# Patient Record
Sex: MALE | Race: WHITE | Employment: FULL TIME | URBAN - NONMETROPOLITAN AREA
[De-identification: names, ages, dates, MRNs, and addresses within clinical notes are randomized per-mention and may not be internally consistent; named-entity substitution may affect disease eponyms.]

---

## 2017-02-07 ENCOUNTER — COMMUNICATION - GICH (OUTPATIENT)
Dept: FAMILY MEDICINE | Facility: OTHER | Age: 52
End: 2017-02-07

## 2017-02-07 DIAGNOSIS — F32.5 MAJOR DEPRESSIVE DISORDER, SINGLE EPISODE, IN FULL REMISSION (H): ICD-10-CM

## 2017-02-08 ENCOUNTER — OFFICE VISIT - GICH (OUTPATIENT)
Dept: FAMILY MEDICINE | Facility: OTHER | Age: 52
End: 2017-02-08

## 2017-02-08 ENCOUNTER — HISTORY (OUTPATIENT)
Dept: FAMILY MEDICINE | Facility: OTHER | Age: 52
End: 2017-02-08

## 2017-02-08 DIAGNOSIS — Z00.00 ENCOUNTER FOR GENERAL ADULT MEDICAL EXAMINATION WITHOUT ABNORMAL FINDINGS: ICD-10-CM

## 2017-02-08 DIAGNOSIS — F33.0 MAJOR DEPRESSIVE DISORDER, RECURRENT, MILD (H): ICD-10-CM

## 2017-02-08 LAB
CHOL/HDL RATIO - HISTORICAL: 5.79
CHOLESTEROL TOTAL: 197 MG/DL
GLUCOSE SERPL-MCNC: 105 MG/DL (ref 70–105)
HDLC SERPL-MCNC: 34 MG/DL (ref 23–92)
LDLC SERPL CALC-MCNC: 143 MG/DL
NON-HDL CHOLESTEROL - HISTORICAL: 163 MG/DL
PATIENT STATUS - HISTORICAL: ABNORMAL
TRIGL SERPL-MCNC: 100 MG/DL

## 2017-02-08 ASSESSMENT — PATIENT HEALTH QUESTIONNAIRE - PHQ9: SUM OF ALL RESPONSES TO PHQ QUESTIONS 1-9: 6

## 2017-02-14 ENCOUNTER — HOSPITAL ENCOUNTER (OUTPATIENT)
Dept: RADIOLOGY | Facility: OTHER | Age: 52
End: 2017-02-14
Attending: FAMILY MEDICINE

## 2017-02-14 DIAGNOSIS — Z00.00 ENCOUNTER FOR GENERAL ADULT MEDICAL EXAMINATION WITHOUT ABNORMAL FINDINGS: ICD-10-CM

## 2018-01-03 NOTE — PROGRESS NOTES
Patient Information     Patient Name MRN Sex Frank Hercules [id number] Male 1965      Progress Notes by Shemar Mckinney MD at 2017  8:34 AM     Author:  Shemar Mckinney MD Service:  (none) Author Type:  Physician     Filed:  2017  1:26 PM Encounter Date:  2017 Status:  Signed     :  Shemar Mckinney MD (Physician)              SUBJECTIVE:    Frank Arthur is a 51 y.o. male who presents for px    HPI: His brother had a triple bypass last year, at age 48.  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  Ended up at [facility] and they told him he has congenital disease.  Patient has no symptoms, but wants to look into it.  Patient has no symptoms at all.  No chest pain or shortness of breath.  He can nordic track and cross country ski for 1 hour without limitations.    Last year we increased the effexor to 225.  He has to pay 2 copays.  For the last month he has been on just 150 and wants to get back to 225 by taking 3 75 mg tabs daily.  PROBLEM LIST:  Patient Active Problem List     Diagnosis  Code     Wrist pain M25.539     Prediabetes R73.03     Major depressive disorder, recurrent, mild (HC) F33.0     Family history of colon cancer requiring screening colonoscopy Z80.0     PAST MEDICAL HISTORY:  Past Medical History     Diagnosis  Date     Family history of colon cancer requiring screening colonoscopy 2016     Major depressive disorder, recurrent episode, mild (HC)      Prediabetes      Wrist pain      SURGICAL HISTORY:  Past Surgical History       Procedure   Laterality Date     Colonoscopy screening        father w colon ca        Vasectomy        Shoulder decompresssion  Right      Pr colonoscopy remove esther polyp [facility]   2016               SOCIAL HISTORY:  Social History     Social History        Marital status:       Spouse name: N/A     Number of children:  N/A     Years of education:  N/A     Occupational History      Not on file.     Social History Main  Topics       Smoking status: Never Smoker     Smokeless tobacco: Never Used     Alcohol use Yes     Drug use: No     Sexual activity: Not on file     Other Topics  Concern     Not on file      Social History Narrative      at [employer].  College at [employer]     FAMILY HISTORY:No family history on file.   CURRENT MEDICATIONS:   Current Outpatient Prescriptions       Medication  Sig Dispense Refill     DME Left thumb spica splint 1 Each 0     polyethylene glycol-electrolyte (NULYTELY) 420 gram solution Take 240 mL by mouth every 10 minutes. 4000 mL 0     venlafaxine (EFFEXOR XR) 150 mg Extended-Release capsule TAKE 1 CAPSULE DAILY WITH EVENING MEAL 90 capsule 3     venlafaxine (EFFEXOR XR) 75 mg cp24 Extended-Release capsule Take 1 capsule by mouth once daily with a meal. Along with 150 mg for total 225 mg daily 90 capsule 3     No current facility-administered medications for this visit.      Medications have been reviewed by me and are current to the best of my knowledge and ability.    ALLERGIES:  Review of patient's allergies indicates no known allergies.    REVIEW OF SYSTEMS:  General: denies any general problems.  Eyes: denies problems  Ears/Nose/Throat: denies problems  Cardiovascular: denies problems  Respiratory: denies problems  Gastrointestinal: denies problems  Genitourinary: denies problems  Musculoskeletal: denies problems  Skin: denies problems  Neurologic: denies problems  Psychiatric: denies problems  Endocrine: denies problems  Heme/Lymphatic: denies problems  Allergic/Immunologic: denies problems  PHQ Depression Screening 2/8/2017   Date of PHQ exam (doc flow) 2/8/2017   1. Lack of interest/pleasure 1 - Several days   2. Feeling down/depressed 1 - Several days   PHQ-2 TOTAL SCORE 2   3. Trouble sleeping 0 - Not at all   4. Decreased energy 1 - Several days   5. Appetite change 1 - Several days   6. Feelings of failure 2 - More than half the days   7. Trouble concentrating 0 - Not at all   8.  "Activity level 0 - Not at all   9. Hurting yourself 0 - Not at all   PHQ-9 TOTAL SCORE 6   PHQ-9 Severity Level mild   Functional Impairment somewhat difficult      OBJECTIVE:  /66  Pulse 70  Resp 14  Ht 1.791 m (5' 10.5\")  Wt 82.9 kg (182 lb 12.8 oz)  BMI 25.86 kg/m2  EXAM:   General Appearance: Pleasant, alert, appropriate appearance for age. No acute distress  Head Exam: Normal. Normocephalic, atraumatic.  Eye Exam:  Normal external eye, conjunctiva, lids, cornea. ROMY.  Ear Exam: Normal TM's bilaterally. Normal auditory canals and external ears. Non-tender.  Nose Exam: Normal external nose, mucus membranes, and septum.  OroPharynx Exam:  Dental hygiene adequate. Normal buccal mucosa. Normal pharynx.  Neck Exam:  Supple, no masses or nodes.  Thyroid Exam: No nodules or enlargement.  Chest/Respiratory Exam: Normal chest wall and respirations. Clear to auscultation.  Cardiovascular Exam: Regular rate and rhythm. S1, S2, no murmur, click, gallop, or rubs.  Gastrointestinal Exam: Soft, non-tender, no masses or organomegaly.  Rectal Exam: Normal sphincter tone. No masses noted.  Lymphatic Exam: Non-palpable nodes in neck, clavicular, axillary, or inguinal regions.  Skin: no rash or abnormalities  Neurologic Exam: Nonfocal, symmetric DTRs, normal gross motor, tone coordination and no tremor.  Psychiatric Exam: Alert and oriented - appropriate affect.    PHQ Depression Screening 1/18/2016 2/8/2017   Date of PHQ exam (doc flow) 1/18/2016 2/8/2017   1. Lack of interest/pleasure 1 - Several days 1 - Several days   2. Feeling down/depressed 1 - Several days 1 - Several days   PHQ-2 TOTAL SCORE 2 2   3. Trouble sleeping 1 - Several days 0 - Not at all   4. Decreased energy 1 - Several days 1 - Several days   5. Appetite change 2 - More than half the days 1 - Several days   6. Feelings of failure 2 - More than half the days 2 - More than half the days   7. Trouble concentrating 1 - Several days 0 - Not at all   8. " Activity level 0 - Not at all 0 - Not at all   9. Hurting yourself 0 - Not at all 0 - Not at all   PHQ-9 TOTAL SCORE 9 6   PHQ-9 Severity Level mild mild   Functional Impairment not difficult at all somewhat difficult       ASSESSMENT/PLAN:    ICD-10-CM    1. Routine medical exam Z00.00 CT CARDIAC CALCIUM SCORE ONLY WO SINGLE READ      LIPID PANEL      GLUCOSE, FASTING   2. Major depressive disorder, recurrent, mild (HC) F33.0 venlafaxine (EFFEXOR XR) 75 mg cp24 Extended-Release capsule     Mr. Poole Body mass index is 25.86 kg/(m^2). This is out of the normal range for a 51 y.o. Normal range for ages 18-64 is between 18.5 and 24.9; normal range for ages 65+ is 23-30. To lose weight we reviewed risks and benefits of appropriate options such as diet, exercise, and medications. Patient's strategy will be  self-directed exercise program  BP Readings from Last 1 Encounters:02/08/17 : 126/66  Mr. Poole blood pressure is out of the normal range for adults. Per JNC-8 guidelines normal adult blood pressure is < 120/80, pre-hypertensive is between 120/80 and 139/89, and hypertension is 140/90 or greater. Risks of hypertension were discussed. Patient's strategy will be to recheck blood pressure in 12 months.    Regarding his coronary artery disease risk, is at 5% on the calculator.  Discussed with him either a stress test or a calcium score.  He wants to do the CT, and I feel this is appropriate.    Shemar Mckinney MD ....................  2/8/2017   8:55 AM

## 2018-01-03 NOTE — TELEPHONE ENCOUNTER
Patient Information     Patient Name MRN Frank Clements N [id number] Male 1965      Telephone Encounter by Catherine Heller RN at 2017  8:54 AM     Author:  Catherine Heller RN Service:  (none) Author Type:  (none)     Filed:  2017  9:01 AM Encounter Date:  2017 Status:  Signed     :  Catherine Heller RN (NURS- Registered Nurse)            Patient will be seen tomorrow and have prescription addressed at that time. Refill forwarded to PCP for consideration at [facility] - 2017 8:15 am.    Depression-in adults 18 and over  Serotonin/Norepinephrine Reuptake Inhibitors    Office visit in the past 12 months or as indicated in chart.  Should have clinic visit 1-2 months after initial prescription.    Last visit with THANG HEATH was on: 2016 in [facility]  Next visit with THANG HEATH is on: 2017 in [facility]  Next visit with Family Practice is on: 2017 in [facility]    Max refills 12 months from last office visit or per providers notes.    PHQ Depression Screening 2016   Date of PHQ exam (doc flow) 2016   1. Lack of interest/pleasure 1 - Several days   2. Feeling down/depressed 1 - Several days   PHQ-2 TOTAL SCORE 2   3. Trouble sleeping 1 - Several days   4. Decreased energy 1 - Several days   5. Appetite change 2 - More than half the days   6. Feelings of failure 2 - More than half the days   7. Trouble concentrating 1 - Several days   8. Activity level 0 - Not at all   9. Hurting yourself 0 - Not at all   PHQ-9 TOTAL SCORE 9   PHQ-9 Severity Level mild   Functional Impairment not difficult at all     Patient will be seen tomorrow and have prescription addressed at that time. Refill forwarded to PCP for consideration at [facility] - 2017 8:15 am.    Unable to complete prescription refill per RN Medication Refill Policy.................... Catherine Heller  ....................  2/7/2017   8:59 AM

## 2018-01-16 ENCOUNTER — COMMUNICATION - GICH (OUTPATIENT)
Dept: FAMILY MEDICINE | Facility: OTHER | Age: 53
End: 2018-01-16

## 2018-01-16 DIAGNOSIS — F33.0 MAJOR DEPRESSIVE DISORDER, RECURRENT, MILD (H): ICD-10-CM

## 2018-01-25 VITALS
DIASTOLIC BLOOD PRESSURE: 66 MMHG | HEART RATE: 70 BPM | SYSTOLIC BLOOD PRESSURE: 126 MMHG | HEIGHT: 71 IN | WEIGHT: 182.8 LBS | RESPIRATION RATE: 14 BRPM | BODY MASS INDEX: 25.59 KG/M2

## 2018-01-29 ASSESSMENT — PATIENT HEALTH QUESTIONNAIRE - PHQ9: SUM OF ALL RESPONSES TO PHQ QUESTIONS 1-9: 6

## 2018-02-13 NOTE — TELEPHONE ENCOUNTER
Patient Information     Patient Name MRN Sex Frank Hercules N [id number] Male 1965      Telephone Encounter by Mayela Hardin RN at 2018 11:27 AM     Author:  Mayela Hardin RN Service:  (none) Author Type:  NURS- Registered Nurse     Filed:  2018 11:28 AM Encounter Date:  2018 Status:  Signed     :  Mayela Hardin RN (NURS- Registered Nurse)            Medication filled 2017 #[id number] R3  Unable to complete prescription refill per RN Medication Refill Policy.................... Mayela Hardin RN ....................  2018   11:28 AM

## 2018-02-21 ENCOUNTER — DOCUMENTATION ONLY (OUTPATIENT)
Dept: FAMILY MEDICINE | Facility: OTHER | Age: 53
End: 2018-02-21

## 2018-02-21 RX ORDER — VENLAFAXINE HYDROCHLORIDE 75 MG/1
225 CAPSULE, EXTENDED RELEASE ORAL
COMMUNITY
Start: 2017-02-08 | End: 2018-03-29

## 2018-02-21 RX ORDER — POLYETHYLENE GLYCOL 3350, SODIUM CHLORIDE, SODIUM BICARBONATE, POTASSIUM CHLORIDE 420; 11.2; 5.72; 1.48 G/4L; G/4L; G/4L; G/4L
240 POWDER, FOR SOLUTION ORAL
COMMUNITY
Start: 2016-04-06 | End: 2019-06-07

## 2018-03-28 DIAGNOSIS — F41.1 GAD (GENERALIZED ANXIETY DISORDER): Primary | ICD-10-CM

## 2018-03-29 RX ORDER — VENLAFAXINE HYDROCHLORIDE 75 MG/1
225 CAPSULE, EXTENDED RELEASE ORAL
Qty: 90 CAPSULE | Refills: 9 | Status: SHIPPED | OUTPATIENT
Start: 2018-03-29 | End: 2018-05-07

## 2018-05-07 ENCOUNTER — TELEPHONE (OUTPATIENT)
Dept: FAMILY MEDICINE | Facility: OTHER | Age: 53
End: 2018-05-07

## 2018-05-07 ENCOUNTER — OFFICE VISIT (OUTPATIENT)
Dept: FAMILY MEDICINE | Facility: OTHER | Age: 53
End: 2018-05-07
Attending: FAMILY MEDICINE
Payer: COMMERCIAL

## 2018-05-07 VITALS
RESPIRATION RATE: 16 BRPM | SYSTOLIC BLOOD PRESSURE: 126 MMHG | HEART RATE: 58 BPM | DIASTOLIC BLOOD PRESSURE: 70 MMHG | HEIGHT: 71 IN | WEIGHT: 181.8 LBS | BODY MASS INDEX: 25.45 KG/M2

## 2018-05-07 DIAGNOSIS — R06.09 DYSPNEA ON EXERTION: ICD-10-CM

## 2018-05-07 DIAGNOSIS — F41.1 GAD (GENERALIZED ANXIETY DISORDER): ICD-10-CM

## 2018-05-07 DIAGNOSIS — Z82.49 FAMILY HISTORY OF CORONARY ARTERY DISEASE IN BROTHER: ICD-10-CM

## 2018-05-07 DIAGNOSIS — Z00.00 ROUTINE GENERAL MEDICAL EXAMINATION AT A HEALTH CARE FACILITY: Primary | ICD-10-CM

## 2018-05-07 LAB
GLUCOSE SERPL-MCNC: 111 MG/DL (ref 70–105)
LDLC SERPL DIRECT ASSAY-MCNC: 82 MG/DL

## 2018-05-07 PROCEDURE — 83721 ASSAY OF BLOOD LIPOPROTEIN: CPT | Performed by: FAMILY MEDICINE

## 2018-05-07 PROCEDURE — 82947 ASSAY GLUCOSE BLOOD QUANT: CPT | Performed by: FAMILY MEDICINE

## 2018-05-07 PROCEDURE — 99396 PREV VISIT EST AGE 40-64: CPT | Performed by: FAMILY MEDICINE

## 2018-05-07 PROCEDURE — 36415 COLL VENOUS BLD VENIPUNCTURE: CPT | Performed by: FAMILY MEDICINE

## 2018-05-07 RX ORDER — VENLAFAXINE HYDROCHLORIDE 150 MG/1
150 TABLET, EXTENDED RELEASE ORAL
Qty: 90 EACH | Refills: 3 | Status: SHIPPED | OUTPATIENT
Start: 2018-05-07 | End: 2018-05-07

## 2018-05-07 RX ORDER — VENLAFAXINE HYDROCHLORIDE 150 MG/1
150 TABLET, EXTENDED RELEASE ORAL
Qty: 90 EACH | Refills: 3 | Status: SHIPPED | OUTPATIENT
Start: 2018-05-07 | End: 2019-06-07

## 2018-05-07 RX ORDER — VENLAFAXINE HYDROCHLORIDE 75 MG/1
CAPSULE, EXTENDED RELEASE ORAL
Qty: 90 CAPSULE | Refills: 3 | Status: SHIPPED | OUTPATIENT
Start: 2018-05-07 | End: 2019-05-05

## 2018-05-07 RX ORDER — VENLAFAXINE HYDROCHLORIDE 75 MG/1
CAPSULE, EXTENDED RELEASE ORAL
Qty: 90 CAPSULE | Refills: 3 | Status: SHIPPED | OUTPATIENT
Start: 2018-05-07 | End: 2018-05-07

## 2018-05-07 ASSESSMENT — ANXIETY QUESTIONNAIRES
7. FEELING AFRAID AS IF SOMETHING AWFUL MIGHT HAPPEN: NOT AT ALL
3. WORRYING TOO MUCH ABOUT DIFFERENT THINGS: NOT AT ALL
2. NOT BEING ABLE TO STOP OR CONTROL WORRYING: NOT AT ALL
IF YOU CHECKED OFF ANY PROBLEMS ON THIS QUESTIONNAIRE, HOW DIFFICULT HAVE THESE PROBLEMS MADE IT FOR YOU TO DO YOUR WORK, TAKE CARE OF THINGS AT HOME, OR GET ALONG WITH OTHER PEOPLE: NOT DIFFICULT AT ALL
1. FEELING NERVOUS, ANXIOUS, OR ON EDGE: NOT AT ALL
5. BEING SO RESTLESS THAT IT IS HARD TO SIT STILL: NOT AT ALL
6. BECOMING EASILY ANNOYED OR IRRITABLE: NOT AT ALL
GAD7 TOTAL SCORE: 0

## 2018-05-07 ASSESSMENT — PATIENT HEALTH QUESTIONNAIRE - PHQ9: 5. POOR APPETITE OR OVEREATING: NOT AT ALL

## 2018-05-07 ASSESSMENT — PAIN SCALES - GENERAL: PAINLEVEL: NO PAIN (0)

## 2018-05-07 NOTE — PROGRESS NOTES
"    SUBJECTIVE:   CC: Frank Arthur is an 52 year old male who presents for preventative health visit.     Healthy Habits:    Do you get at least three servings of calcium containing foods daily (dairy, green leafy vegetables, etc.)? yes    Amount of exercise or daily activities, outside of work: 1 hour(s) per day    Problems taking medications regularly No    Medication side effects: No    Have you had an eye exam in the past two years? yes    Do you see a dentist twice per year? yes    Do you have sleep apnea, excessive snoring or daytime drowsiness?no       Brother with significant cad, sp CABG.  Younger brother.  Lately pt notes he has had heavier breathing with yard work.  Walks his dog over 3 miles daily, and sometimes has some shortness of breath with this, but never any chest pain at all.  Feels \"anxiety in my torso\", when worried about his brother.    Today's PHQ-2 Score:   PHQ-2 ( 1999 Pf[employer]) 5/7/2018   Q1: Little interest or pleasure in doing things 0   Q2: Feeling down, depressed or hopeless 0   PHQ-2 Score 0           Social History   Substance Use Topics     Smoking status: Never Smoker     Smokeless tobacco: Never Used     Alcohol use Yes      If you drink alcohol do you typically have >3 drinks per day or >7 drinks per week? No                      Last PSA: No results found for: PSA    Reviewed orders with patient. Reviewed health maintenance and updated orders accordingly - Yes  Labs reviewed in [facility]  Current Outpatient Prescriptions   Medication Sig Dispense Refill     venlafaxine (EFFEXOR-ER) 150 MG TB24 24 hr tablet Take 1 tablet (150 mg) by mouth daily (with breakfast) 90 each 3     venlafaxine (EFFEXOR-XR) 75 MG 24 hr capsule 1 daily along with 1 150 milligram for 225 milligram daily total 90 capsule 3     OMEPRAZOLE PO Take 20 mg by mouth 2 times daily (before meals)       order for DME Left thumb spica splint       polyethylene glycol-electrolytes (NULYTELY) 420 G solution Take 240 mLs " "by mouth every 10 minutes       [DISCONTINUED] venlafaxine (EFFEXOR-ER) 150 MG TB24 Take 150 mg by mouth daily (with breakfast)       [DISCONTINUED] venlafaxine (EFFEXOR-XR) 75 MG 24 hr capsule Take 3 capsules (225 mg) by mouth daily with food 90 capsule 9     No Known Allergies    Reviewed and updated as needed this visit by clinical staff  Tobacco  Allergies  Meds  Med Hx  Surg Hx  Fam Hx  Soc Hx        Reviewed and updated as needed this visit by Provider        Past Medical History:   Diagnosis Date     Family history of malignant neoplasm of digestive organ     4/7/2016     Major depressive disorder, recurrent, mild (H)     No Comments Provided     Pain in wrist     No Comments Provided     Prediabetes     No Comments Provided      Past Surgical History:   Procedure Laterality Date     COLONOSCOPY      2005,father w colon ca     COLONOSCOPY      4/7/2016     OTHER SURGICAL HISTORY      [id number],OTHER,Right     VASECTOMY      No Comments Provided       ROS:  C: NEGATIVE for fever, chills, change in weight  I: NEGATIVE for worrisome rashes, moles or lesions  E: NEGATIVE for vision changes or irritation  ENT: NEGATIVE for ear, mouth and throat problems  RESP:as above  CV: NEGATIVE for chest pain, palpitations or peripheral edema  GI: NEGATIVE for nausea, abdominal pain, heartburn, or change in bowel habits   male: negative for dysuria, hematuria, decreased urinary stream, erectile dysfunction, urethral discharge  M: NEGATIVE for significant arthralgias or myalgia  N: NEGATIVE for weakness, dizziness or paresthesias  P: NEGATIVE for changes in mood or affect    OBJECTIVE:   /70 (BP Location: Right arm, Patient Position: Sitting, Cuff Size: Adult Large)  Pulse 58  Resp 16  Ht 5' 10.5\" (1.791 m)  Wt 181 lb 12.8 oz (82.5 kg)  BMI 25.72 kg/m2  EXAM:  GENERAL: healthy, alert and no distress  EYES: Eyes grossly normal to inspection, PERRL and conjunctivae and sclerae normal  HENT: ear canals and TM's " "normal, nose and mouth without ulcers or lesions  NECK: no adenopathy, no asymmetry, masses, or scars and thyroid normal to palpation  RESP: lungs clear to auscultation - no rales, rhonchi or wheezes  CV: regular rate and rhythm, normal S1 S2, no S3 or S4, no murmur, click or rub, no peripheral edema and peripheral pulses strong  ABDOMEN: soft, nontender, no hepatosplenomegaly, no masses and bowel sounds normal  MS: no gross musculoskeletal defects noted, no edema  SKIN: no suspicious lesions or rashes  NEURO: Normal strength and tone, mentation intact and speech normal  PSYCH: mentation appears normal, affect normal/bright    ASSESSMENT/PLAN:   (Z00.00) Routine general medical examination at a health care facility  (primary encounter diagnosis)  Comment: stable  Plan: LDL cholesterol direct, Glucose             (F41.1) RAFAELA (generalized anxiety disorder)  Comment: stable  Plan: venlafaxine (EFFEXOR-ER) 150 MG TB24 24 hr         tablet, venlafaxine (EFFEXOR-XR) 75 MG 24 hr         capsule        refilled    (R06.09) Dyspnea on exertion  Comment: new  Plan: Exercise Stress test        Has a possible cad diagnosis.  Since he wants to remain active, will get a stress test.    (Z82.49) Family history of coronary artery disease in brother  Comment:    Plan: Exercise Stress test               COUNSELING:  Reviewed preventive health counseling, as reflected in patient instructions       Regular exercise       Healthy diet/nutrition    BP Screening:   Last 3 BP Readings:    BP Readings from Last 3 Encounters:   05/07/18 126/70   02/08/17 126/66   01/18/16 128/68       The following was recommended to the patient:  Re-screen BP within a year and recommended lifestyle modifications   reports that he has never smoked. He has never used smokeless tobacco.    Estimated body mass index is 25.72 kg/(m^2) as calculated from the following:    Height as of this encounter: 5' 10.5\" (1.791 m).    Weight as of this encounter: 181 lb 12.8 " oz (82.5 kg).   Weight management plan: Discussed healthy diet and exercise guidelines and patient will follow up in 12 months in clinic to re-evaluate.    Counseling Resources:  ATP IV Guidelines  Pooled Cohorts Equation Calculator  FRAX Risk Assessment  ICSI Preventive Guidelines  Dietary Guidelines for Americans, 2010  USDA's MyPlate  ASA Prophylaxis  Lung CA Screening    Shemar Mckinney MD  [facility] AND [facility]

## 2018-05-07 NOTE — LETTER
"May 8, 2018      Frank Arthur  [address] [place] [address]        Dear Frank,     The labs still show the \"pre\" diabetes.  Keep working on the diet changes.  Cholesterol is great.    Results for orders placed or performed in visit on 05/07/18   LDL cholesterol direct   Result Value Ref Range    LDL Cholesterol Direct 82 <100 mg/dL   Glucose   Result Value Ref Range    Glucose 111 (H) 70 - 105 mg/dL           Sincerely,        Shemar Mckinney MD          "

## 2018-05-07 NOTE — TELEPHONE ENCOUNTER
Would need a 90 day of the effexor to add up to 225 mg  Rose Hollingsworth LPN on 5/7/2018 at 11:08 AM

## 2018-05-07 NOTE — MR AVS SNAPSHOT
After Visit Summary   5/7/2018    Frank Arthur    MRN: [id number]           Patient Information     Date Of Birth          1965        Visit Information        Provider Department      5/7/2018 8:15 AM Shemar Mckinney MD [facility]        Today's Diagnoses     Routine general medical examination at a health care facility    -  1    RAFAELA (generalized anxiety disorder)        Dyspnea on exertion        Family history of coronary artery disease in brother          Care Instructions      Preventive Health Recommendations  Male Ages 50 - 64    Yearly exam:             See your health care provider every year in order to  o   Review health changes.   o   Discuss preventive care.    o   Review your medicines if your doctor has prescribed any.     Have a cholesterol test every 5 years, or more frequently if you are at risk for high cholesterol/heart disease.     Have a diabetes test (fasting glucose) every three years. If you are at risk for diabetes, you should have this test more often.     Have a colonoscopy at age 50, or have a yearly FIT test (stool test). These exams will check for colon cancer.      Talk with your health care provider about whether or not a prostate cancer screening test (PSA) is right for you.    You should be tested each year for STDs (sexually transmitted diseases), if you re at risk.     Shots: Get a flu shot each year. Get a tetanus shot every 10 years.     Nutrition:    Eat at least 5 servings of fruits and vegetables daily.     Eat whole-grain bread, whole-wheat pasta and brown rice instead of white grains and rice.     Talk to your provider about Calcium and Vitamin D.     Lifestyle    Exercise for at least 150 minutes a week (30 minutes a day, 5 days a week). This will help you control your weight and prevent disease.     Limit alcohol to one drink per day.     No smoking.     Wear sunscreen to prevent skin cancer.     See your dentist every six  "months for an exam and cleaning.     See your eye doctor every 1 to 2 years.            Follow-ups after your visit        Future tests that were ordered for you today     Open Future Orders        Priority Expected Expires Ordered    Exercise Stress test Routine  2018            Who to contact     If you have questions or need follow up information about today's clinic visit or your schedule please contact [facility] directly at [phone].  Normal or non-critical lab and imaging results will be communicated to you by Myfacepagehart, letter or phone within 4 business days after the clinic has received the results. If you do not hear from us within 7 days, please contact the clinic through Twitpayt or phone. If you have a critical or abnormal lab result, we will notify you by phone as soon as possible.  Submit refill requests through [employer] or call your pharmacy and they will forward the refill request to us. Please allow 3 business days for your refill to be completed.          Additional Information About Your Visit        [employer] Information     [employer] lets you send messages to your doctor, view your test results, renew your prescriptions, schedule appointments and more. To sign up, go to [email or web address] . Click on \"Log in\" on the left side of the screen, which will take you to the Welcome page. Then click on \"Sign up Now\" on the right side of the page.     You will be asked to enter the access code listed below, as well as some personal information. Please follow the directions to create your username and password.     Your access code is: [email or web address]  Expires: 2018  9:02 AM     Your access code will  in 90 days. If you need help or a new code, please call your [place] clinic or [phone].        Care EveryWhere ID     This is your Care EveryWhere ID. This could be used by other organizations to access your [place] medical records  [id number]        Your " "Vitals Were     Pulse Respirations Height BMI (Body Mass Index)          58 16 5' 10.5\" (1.791 m) 25.72 kg/m2         Blood Pressure from Last 3 Encounters:   05/07/18 126/70   02/08/17 126/66   01/18/16 128/68    Weight from Last 3 Encounters:   05/07/18 181 lb 12.8 oz (82.5 kg)   02/08/17 182 lb 12.8 oz (82.9 kg)   01/18/16 182 lb 3.2 oz (82.6 kg)              We Performed the Following     Glucose     LDL cholesterol direct          Today's Medication Changes          These changes are accurate as of 5/7/18  9:02 AM.  If you have any questions, ask your nurse or doctor.               These medicines have changed or have updated prescriptions.        Dose/Directions    * venlafaxine 150 MG Tb24 24 hr tablet   Commonly known as:  EFFEXOR-ER   This may have changed:  Another medication with the same name was changed. Make sure you understand how and when to take each.   Used for:  RAFAELA (generalized anxiety disorder)   Changed by:  Shemar Mckinney MD        Dose:  150 mg   Take 1 tablet (150 mg) by mouth daily (with breakfast)   Quantity:  90 each   Refills:  3       * venlafaxine 75 MG 24 hr capsule   Commonly known as:  EFFEXOR-XR   This may have changed:    - how much to take  - how to take this  - when to take this  - additional instructions   Used for:  RAFAELA (generalized anxiety disorder)   Changed by:  Shemar Mckinney MD        1 daily along with 1 150 milligram for 225 milligram daily total   Quantity:  90 capsule   Refills:  3       * Notice:  This list has 2 medication(s) that are the same as other medications prescribed for you. Read the directions carefully, and ask your doctor or other care provider to review them with you.         Where to get your medicines      These medications were sent to [facility] [address] [facility] [place] - [address]  [address] [facility] [address]     Phone:  [phone]     venlafaxine 150 MG Tb24 24 hr tablet    venlafaxine 75 MG 24 hr capsule          "       Primary Care Provider Office Phone # Fax #    Shemar Mckinney [phone] [address]  [facility] [address]        Equal Access to Services     LONIRAMIREZ NICKO : Hadii aad ku hadkimkenneth Jim, wajohnnyda luwilmer, qaestherta kabrenda chambers, brooks bynumlila solimanlola milianyessica benavides. So [facility] [phone].    ATENCIÓN: Si habla español, tiene a back disposición servicios gratuitos de asistencia lingüística. Llame al [phone].    We comply with applicable federal civil rights laws and [place] laws. We do not discriminate on the basis of race, color, national origin, age, disability, sex, sexual orientation, or gender identity.            Thank you!     Thank you for choosing [facility]  for your care. Our goal is always to provide you with excellent care. Hearing back from our patients is one way we can continue to improve our services. Please take a few minutes to complete the written survey that you may receive in the mail after your visit with us. Thank you!             Your Updated Medication List - Protect others around you: Learn how to safely use, store and throw away your medicines at www.disposemymeds.org.          This list is accurate as of 5/7/18  9:02 AM.  Always use your most recent med list.                   Brand Name Dispense Instructions for use Diagnosis    OMEPRAZOLE PO      Take 20 mg by mouth 2 times daily (before meals)        order for DME      Left thumb spica splint        polyethylene glycol-electrolytes 420 g solution    NULYTELY     Take 240 mLs by mouth every 10 minutes        * venlafaxine 150 MG Tb24 24 hr tablet    EFFEXOR-ER    90 each    Take 1 tablet (150 mg) by mouth daily (with breakfast)    RAFAELA (generalized anxiety disorder)       * venlafaxine 75 MG 24 hr capsule    EFFEXOR-XR    90 capsule    1 daily along with 1 150 milligram for 225 milligram daily total    RAFAELA (generalized anxiety disorder)       * Notice:  This list  has 2 medication(s) that are the same as other medications prescribed for you. Read the directions carefully, and ask your doctor or other care provider to review them with you.

## 2018-05-07 NOTE — LETTER
May 7, 2018      Frank Arthur  [address] [place] [address]        Dear Frank,       Results for orders placed or performed in visit on 05/07/18   Glucose   Result Value Ref Range    Glucose 111 (H) 70 - 105 mg/dL         Sincerely,        Shemar Mckinney MD

## 2018-05-08 ASSESSMENT — ANXIETY QUESTIONNAIRES: GAD7 TOTAL SCORE: 0

## 2018-05-17 ENCOUNTER — TELEPHONE (OUTPATIENT)
Dept: CARDIAC REHAB | Facility: OTHER | Age: 53
End: 2018-05-17

## 2018-05-17 NOTE — TELEPHONE ENCOUNTER
Called to remind patient of stress test 5/21/18. Message with our direct phone number to call if any questions.

## 2018-05-21 ENCOUNTER — HOSPITAL ENCOUNTER (OUTPATIENT)
Dept: CARDIOLOGY | Facility: OTHER | Age: 53
Discharge: HOME | End: 2018-05-21
Attending: FAMILY MEDICINE | Admitting: FAMILY MEDICINE
Payer: COMMERCIAL

## 2018-05-21 DIAGNOSIS — R06.09 DYSPNEA ON EXERTION: ICD-10-CM

## 2018-05-21 DIAGNOSIS — Z13.220 LIPID SCREENING: ICD-10-CM

## 2018-05-21 DIAGNOSIS — Z82.49 FAMILY HISTORY OF CORONARY ARTERY DISEASE IN BROTHER: ICD-10-CM

## 2018-05-21 LAB
CHOLEST SERPL-MCNC: 156 MG/DL
HDLC SERPL-MCNC: 36 MG/DL (ref 23–92)
LDLC SERPL CALC-MCNC: 80 MG/DL
NONHDLC SERPL-MCNC: 120 MG/DL
TRIGL SERPL-MCNC: 202 MG/DL

## 2018-05-21 PROCEDURE — 93017 CV STRESS TEST TRACING ONLY: CPT

## 2018-05-21 PROCEDURE — 93015 CV STRESS TEST SUPVJ I&R: CPT | Performed by: INTERNAL MEDICINE

## 2018-05-21 PROCEDURE — 80061 LIPID PANEL: CPT | Performed by: FAMILY MEDICINE

## 2018-05-21 PROCEDURE — 36415 COLL VENOUS BLD VENIPUNCTURE: CPT | Performed by: FAMILY MEDICINE

## 2018-05-21 NOTE — PROGRESS NOTES
08:00 Patient arrived for an exercise stress test. The procedure was explained, medications, allergies and history reviewed. The patient was prepped for the stress test.  arrived, and the patient walked 10 minutes and 16 seconds. The patient tolerated the procedure. The patient left in stable condition. Please see the test results, for a full report.

## 2018-05-23 ENCOUNTER — TELEPHONE (OUTPATIENT)
Dept: FAMILY MEDICINE | Facility: OTHER | Age: 53
End: 2018-05-23

## 2019-05-05 DIAGNOSIS — F41.1 GAD (GENERALIZED ANXIETY DISORDER): ICD-10-CM

## 2019-05-05 NOTE — LETTER
May 7, 2019      Frank Arthur  [address] [place] [address]        Dear Frank,     This letter is to remind you that you are due for your annual exam and labs with Shemar Mckinney. Your last comprehensive visit was more than 12 months ago.    A LIMITED refill of Venlafaxine Hcl ER caps 75 mg has been called into your pharmacy. Additional refills require you to complete this appointment.    Please call the clinic at [phone] to schedule your appointment.    If you should require additional refills before your scheduled appointment, please contact your pharmacy and we will refill your medication until the date of your appointment.    If you are no longer seeing Shemar Mckinney for primary care, please call to let us know. Doing so will remove you from our call/contact list.      Thank you for choosing [facility] and [facility] for your health care needs.    Sincerely,    Refill ARIANNA  [facility]

## 2019-05-07 RX ORDER — VENLAFAXINE HYDROCHLORIDE 75 MG/1
CAPSULE, EXTENDED RELEASE ORAL
Qty: 90 CAPSULE | Refills: 0 | Status: SHIPPED | OUTPATIENT
Start: 2019-05-07 | End: 2019-06-07

## 2019-05-07 NOTE — TELEPHONE ENCOUNTER
[facility] sent Rx request for the following:      VENLAFAXINE HCL ER CAPS 75MG  Sig: TAKE 1 CAPSULE DAILY ALONG WITH 150 MG  MG DAILY TOTAL  Last Prescription Date:   5/7/18  Last Fill Qty/Refills:         90, R-3    Last Office Visit:              5/7/18 (Px)  Future Office visit:           None.  Serotonin-Norepinephrine Reuptake Inhibitors Failed5/7 4:31 PM   Blood pressure under 140/90 in past 12 months    Recent (12 mo) or future (30 days) visit within the authorizing provider's specialty    Normal serum creatinine on file in past 12 months     Patient due for annual physical and labs. Reminder letter sent to Pt. Prescription approved per [facility] Refill Protocol for 90-day prachi refill. Ángela Silver RN .............. 5/7/2019  4:35 PM

## 2019-06-07 ENCOUNTER — OFFICE VISIT (OUTPATIENT)
Dept: FAMILY MEDICINE | Facility: OTHER | Age: 54
End: 2019-06-07
Attending: FAMILY MEDICINE
Payer: COMMERCIAL

## 2019-06-07 VITALS
TEMPERATURE: 98.2 F | BODY MASS INDEX: 25.91 KG/M2 | HEART RATE: 76 BPM | SYSTOLIC BLOOD PRESSURE: 124 MMHG | WEIGHT: 181 LBS | HEIGHT: 70 IN | DIASTOLIC BLOOD PRESSURE: 82 MMHG | RESPIRATION RATE: 16 BRPM

## 2019-06-07 DIAGNOSIS — F41.1 GAD (GENERALIZED ANXIETY DISORDER): ICD-10-CM

## 2019-06-07 DIAGNOSIS — R73.09 ELEVATED GLUCOSE: ICD-10-CM

## 2019-06-07 DIAGNOSIS — Z00.00 ROUTINE GENERAL MEDICAL EXAMINATION AT A HEALTH CARE FACILITY: Primary | ICD-10-CM

## 2019-06-07 LAB
CHOLEST SERPL-MCNC: 201 MG/DL
HBA1C MFR BLD: 6.1 % (ref 4–6)
HDLC SERPL-MCNC: 36 MG/DL (ref 23–92)
LDLC SERPL CALC-MCNC: 126 MG/DL
NONHDLC SERPL-MCNC: 165 MG/DL
TRIGL SERPL-MCNC: 193 MG/DL

## 2019-06-07 PROCEDURE — 36415 COLL VENOUS BLD VENIPUNCTURE: CPT | Mod: ZL | Performed by: FAMILY MEDICINE

## 2019-06-07 PROCEDURE — 99396 PREV VISIT EST AGE 40-64: CPT | Performed by: FAMILY MEDICINE

## 2019-06-07 PROCEDURE — 80061 LIPID PANEL: CPT | Mod: ZL | Performed by: FAMILY MEDICINE

## 2019-06-07 PROCEDURE — 83036 HEMOGLOBIN GLYCOSYLATED A1C: CPT | Mod: ZL | Performed by: FAMILY MEDICINE

## 2019-06-07 RX ORDER — VENLAFAXINE HYDROCHLORIDE 75 MG/1
CAPSULE, EXTENDED RELEASE ORAL
Qty: 90 CAPSULE | Refills: 3 | Status: SHIPPED | OUTPATIENT
Start: 2019-06-07 | End: 2020-06-22

## 2019-06-07 RX ORDER — VENLAFAXINE HYDROCHLORIDE 150 MG/1
150 TABLET, EXTENDED RELEASE ORAL
Qty: 90 EACH | Refills: 3 | Status: SHIPPED | OUTPATIENT
Start: 2019-06-07 | End: 2020-06-22

## 2019-06-07 ASSESSMENT — ANXIETY QUESTIONNAIRES
1. FEELING NERVOUS, ANXIOUS, OR ON EDGE: SEVERAL DAYS
2. NOT BEING ABLE TO STOP OR CONTROL WORRYING: SEVERAL DAYS
GAD7 TOTAL SCORE: 7
3. WORRYING TOO MUCH ABOUT DIFFERENT THINGS: SEVERAL DAYS
5. BEING SO RESTLESS THAT IT IS HARD TO SIT STILL: SEVERAL DAYS
6. BECOMING EASILY ANNOYED OR IRRITABLE: SEVERAL DAYS
7. FEELING AFRAID AS IF SOMETHING AWFUL MIGHT HAPPEN: SEVERAL DAYS
IF YOU CHECKED OFF ANY PROBLEMS ON THIS QUESTIONNAIRE, HOW DIFFICULT HAVE THESE PROBLEMS MADE IT FOR YOU TO DO YOUR WORK, TAKE CARE OF THINGS AT HOME, OR GET ALONG WITH OTHER PEOPLE: NOT DIFFICULT AT ALL

## 2019-06-07 ASSESSMENT — PATIENT HEALTH QUESTIONNAIRE - PHQ9
5. POOR APPETITE OR OVEREATING: SEVERAL DAYS
SUM OF ALL RESPONSES TO PHQ QUESTIONS 1-9: 4

## 2019-06-07 ASSESSMENT — MIFFLIN-ST. JEOR: SCORE: 1676.23

## 2019-06-07 NOTE — PROGRESS NOTES
SUBJECTIVE:   CC: Frank Arthur is an 53 year old male who presents for preventive health visit.     Healthy Habits:    Do you get at least three servings of calcium containing foods daily (dairy, green leafy vegetables, etc.)? yes    Amount of exercise or daily activities, outside of work: 0 day(s) per week    Problems taking medications regularly No    Medication side effects: No    Have you had an eye exam in the past two years? yes    Do you see a dentist twice per year? yes    Do you have sleep apnea, excessive snoring or daytime drowsiness?no      Has been on effexor for over 18 years.  Anxiety is great, but he does not want to trial a wean.  Is thinking about one eventually.    Also his fasting glucose was 111 last year, has not changed anything.  Wife recently diagnosed with diabetes and he has been eating better.    Today's PHQ-2 Score:   PHQ-2 ( 1999 Pf[employer]) 5/7/2018   Q1: Little interest or pleasure in doing things 0   Q2: Feeling down, depressed or hopeless 0   PHQ-2 Score 0       Abuse: Current or Past(Physical, Sexual or Emotional)- No  Do you feel safe in your environment? Yes    Social History     Tobacco Use     Smoking status: Never Smoker     Smokeless tobacco: Never Used   Substance Use Topics     Alcohol use: Yes     If you drink alcohol do you typically have >3 drinks per day or >7 drinks per week? No                      Last PSA: No results found for: PSA    Reviewed orders with patient. Reviewed health maintenance and updated orders accordingly - Yes  Labs reviewed in [facility]  Current Outpatient Medications   Medication Sig Dispense Refill     venlafaxine (EFFEXOR-ER) 150 MG 24 hr tablet Take 1 tablet (150 mg) by mouth daily (with breakfast) 90 each 3     venlafaxine (EFFEXOR-XR) 75 MG 24 hr capsule TAKE 1 CAPSULE DAILY ALONG WITH 150 MG  MG DAILY TOTAL 90 capsule 3     No Known Allergies    Reviewed and updated as needed this visit by clinical staff  Tobacco  Allergies  Meds   "Soc Hx        Reviewed and updated as needed this visit by Provider        Past Medical History:   Diagnosis Date     Family history of malignant neoplasm of digestive organ     4/7/2016     Major depressive disorder, recurrent, mild (H)     No Comments Provided     Pain in wrist     No Comments Provided     Prediabetes     No Comments Provided      Past Surgical History:   Procedure Laterality Date     ARTHROSCOPY SHOULDER DECOMPRESSION Right           COLONOSCOPY  2005    father w/ colon cancer     COLONOSCOPY  04/07/2016          VASECTOMY      No Comments Provided       ROS:  CONSTITUTIONAL: NEGATIVE for fever, chills, change in weight  INTEGUMENTARY/SKIN: NEGATIVE for worrisome rashes, moles or lesions  EYES: NEGATIVE for vision changes or irritation  ENT: NEGATIVE for ear, mouth and throat problems  RESP: NEGATIVE for significant cough or SOB  CV: NEGATIVE for chest pain, palpitations or peripheral edema  GI: NEGATIVE for nausea, abdominal pain, heartburn, or change in bowel habits   male: negative for dysuria, hematuria, decreased urinary stream, erectile dysfunction, urethral discharge  MUSCULOSKELETAL: NEGATIVE for significant arthralgias or myalgia  NEURO: NEGATIVE for weakness, dizziness or paresthesias  PSYCHIATRIC: NEGATIVE for changes in mood or affect    OBJECTIVE:   /82   Pulse 76   Temp 98.2  F (36.8  C)   Resp 16   Ht 1.784 m (5' 10.25\")   Wt 82.1 kg (181 lb)   BMI 25.79 kg/m    EXAM:  GENERAL: healthy, alert and no distress  EYES: Eyes grossly normal to inspection, PERRL and conjunctivae and sclerae normal  HENT: ear canals and TM's normal, nose and mouth without ulcers or lesions  NECK: no adenopathy, no asymmetry, masses, or scars and thyroid normal to palpation  RESP: lungs clear to auscultation - no rales, rhonchi or wheezes  CV: regular rate and rhythm, normal S1 S2, no S3 or S4, no murmur, click or rub, no peripheral edema and peripheral pulses strong  ABDOMEN: soft, nontender, " "no hepatosplenomegaly, no masses and bowel sounds normal  MS: no gross musculoskeletal defects noted, no edema  SKIN: no suspicious lesions or rashes  NEURO: Normal strength and tone, mentation intact and speech normal  PSYCH: mentation appears normal, affect normal/bright        ASSESSMENT/PLAN:       ICD-10-CM    1. Routine general medical examination at a health care facility Z00.00 Lipid Profile   2. RAFAELA (generalized anxiety disorder) F41.1 venlafaxine (EFFEXOR-XR) 75 MG 24 hr capsule     venlafaxine (EFFEXOR-ER) 150 MG 24 hr tablet   3. Elevated glucose R73.09 Hemoglobin A1c       COUNSELING:  Reviewed preventive health counseling, as reflected in patient instructions       Regular exercise       Healthy diet/nutrition       Vision screening       Colon cancer screening       Prostate cancer screening    Estimated body mass index is 25.79 kg/m  as calculated from the following:    Height as of this encounter: 1.784 m (5' 10.25\").    Weight as of this encounter: 82.1 kg (181 lb).    Weight management plan: Discussed healthy diet and exercise guidelines     reports that he has never smoked. He has never used smokeless tobacco.      Counseling Resources:  ATP IV Guidelines  Pooled Cohorts Equation Calculator  FRAX Risk Assessment  ICSI Preventive Guidelines  Dietary Guidelines for Americans, 2010  USDA's MyPlate  ASA Prophylaxis  Lung CA Screening    Shemar Mckinney MD  [facility]  "

## 2019-06-07 NOTE — NURSING NOTE
No LMP for male patient.  Medication Reconciliation: complete    Caro Henry LPN  6/7/2019 8:51 AM

## 2019-06-08 ASSESSMENT — ANXIETY QUESTIONNAIRES: GAD7 TOTAL SCORE: 7

## 2019-09-11 DIAGNOSIS — F41.1 GAD (GENERALIZED ANXIETY DISORDER): ICD-10-CM

## 2019-09-16 RX ORDER — VENLAFAXINE HYDROCHLORIDE 150 MG/1
150 TABLET, EXTENDED RELEASE ORAL
Qty: 90 EACH | Refills: 3 | OUTPATIENT
Start: 2019-09-16

## 2019-09-16 NOTE — TELEPHONE ENCOUNTER
[facility] sent Rx request for the following:      Venlafaxine 150 mg 24 hr tablet      Last Prescription Date:   6/7/19  Last Fill Qty/Refills:         90, R-3    1 year supply [facility] on 6/7/[facility]  Too soon for additional refills.  Refill request refused.    Unable to complete prescription refill per RNMedication Refill Policy.................... Ángela Sena RN ....................  9/16/2019   10:08 AM

## 2020-06-22 ENCOUNTER — MYC MEDICAL ADVICE (OUTPATIENT)
Dept: FAMILY MEDICINE | Facility: OTHER | Age: 55
End: 2020-06-22

## 2020-06-22 DIAGNOSIS — F41.1 GAD (GENERALIZED ANXIETY DISORDER): Primary | ICD-10-CM

## 2020-06-22 DIAGNOSIS — F41.1 GAD (GENERALIZED ANXIETY DISORDER): ICD-10-CM

## 2020-06-22 RX ORDER — VENLAFAXINE HYDROCHLORIDE 150 MG/1
150 TABLET, EXTENDED RELEASE ORAL DAILY
Qty: 15 EACH | Refills: 0 | Status: SHIPPED | OUTPATIENT
Start: 2020-06-22 | End: 2021-08-25

## 2020-06-22 RX ORDER — VENLAFAXINE HYDROCHLORIDE 150 MG/1
150 TABLET, EXTENDED RELEASE ORAL
Qty: 90 EACH | Refills: 3 | Status: SHIPPED | OUTPATIENT
Start: 2020-06-22 | End: 2021-05-13

## 2020-06-22 RX ORDER — VENLAFAXINE HYDROCHLORIDE 75 MG/1
CAPSULE, EXTENDED RELEASE ORAL
Qty: 90 CAPSULE | Refills: 3 | Status: SHIPPED | OUTPATIENT
Start: 2020-06-22 | End: 2021-05-13

## 2020-12-20 ENCOUNTER — HEALTH MAINTENANCE LETTER (OUTPATIENT)
Age: 55
End: 2020-12-20

## 2021-05-13 DIAGNOSIS — F41.1 GAD (GENERALIZED ANXIETY DISORDER): ICD-10-CM

## 2021-05-13 RX ORDER — VENLAFAXINE HYDROCHLORIDE 150 MG/1
TABLET, EXTENDED RELEASE ORAL
Qty: 90 TABLET | Refills: 0 | Status: SHIPPED | OUTPATIENT
Start: 2021-05-13 | End: 2021-08-25

## 2021-05-13 RX ORDER — VENLAFAXINE HYDROCHLORIDE 75 MG/1
CAPSULE, EXTENDED RELEASE ORAL
Qty: 90 CAPSULE | Refills: 0 | Status: SHIPPED | OUTPATIENT
Start: 2021-05-13 | End: 2021-07-09

## 2021-05-13 NOTE — TELEPHONE ENCOUNTER
"[facility] sent Rx request for the following:   venlafaxine (EFFEXOR-XR) 75 MG 24 hr capsule  Sig: TAKE 1 CAPSULE DAILY ALONG WITH 150 MG  MG DAILY TOTAL.    Last Prescription Date:   06/22/2020  Last Fill Qty/Refills:         90, R-3       Serotonin-Norepinephrine Reuptake Inhibitors  Failed - 5/13/2021 12:24 AM       Failed - Blood pressure under 140/90 in past 12 months    BP Readings from Last 3 Encounters:   06/07/19 124/82   05/07/18 126/70   02/08/17 126/66                Failed - Recent (12 mo) or future (30 days) visit within the authorizing provider's specialty    Patient has had an office visit with the authorizing provider or a provider within the authorizing providers department within the previous 12 mos or has a future within next 30 days. See \"Patient Info\" tab in inbasket, or \"Choose Columns\" in Meds & Orders section of the refill encounter.             Failed - Normal serum creatinine on file in past 12 months    Recent Labs   Lab Test 01/18/16  0951   CR 0.98       Ok to refill medication if creatinine is low         venlafaxine (EFFEXOR-ER) 150 MG 24 hr tablet  Sig: TAKE 1 TABLET DAILY WITH BREAKFAST    Last Prescription Date:   06/22/2020  Last Fill Qty/Refills:         90, R-3    Serotonin-Norepinephrine Reuptake Inhibitors  Failed - 5/13/2021 12:24 AM       Failed - Blood pressure under 140/90 in past 12 months    BP Readings from Last 3 Encounters:   06/07/19 124/82   05/07/18 126/70   02/08/17 126/66                Failed - Recent (12 mo) or future (30 days) visit within the authorizing provider's specialty    Patient has had an office visit with the authorizing provider or a provider within the authorizing providers department within the previous 12 mos or has a future within next 30 days. See \"Patient Info\" tab in inbasket, or \"Choose Columns\" in Meds & Orders section of the refill encounter.             Failed - Normal serum creatinine on file in past 12 months "    Recent Labs   Lab Test 01/18/16  0951   CR 0.98       Ok to refill medication if creatinine is low       Last Office Visit:              06/07/2019 ([facility])   Future Office visit:           None noted    Patient overdue for annual review. Letter sent. Prescription approved per [facility] Refill Protocol for 90 day prachi refill with notation made to requesting pharmacy. Twyla Zapata RN ....................  5/13/2021   10:38 AM

## 2021-05-13 NOTE — LETTER
May 13, 2021      Frank Arthur  [address] [place] [address]        Dear Frank,         A refill of venlafaxine (EFFEXOR-XR) 75 MG 24 hr capsule andvenlafaxine (EFFEXOR-ER) 150 MG 24 hr tablet have been requested by your pharmacy.  We noticed that it has been greater than 12 months since your last comprehensive visit and labs with Shemar Mckinney MD.  A limited 90 day supply has been sent to your pharmacy at this time.    Additional refills require a medication management appointment.  Your health is very important to us.  Please call the clinic at [phone] to schedule your appointment.    Thank you,    The Refill Nurse  [facility]

## 2021-07-08 DIAGNOSIS — F41.1 GAD (GENERALIZED ANXIETY DISORDER): ICD-10-CM

## 2021-07-09 NOTE — TELEPHONE ENCOUNTER
"[facility] sent Rx request for the following:   venlafaxine (EFFEXOR-XR) 75 MG 24 hr capsule  Sig TAKE 1 CAPSULE DAILY ALONG WITH 150 MG  MG DAILY TOTAL (NEED APPOINTMENT FOR FURTHER REFILLS)    Last Prescription Date:   05/13/2021  Last Fill Qty/Refills:         90, R-0    Last Office Visit:              06/07/2019 ([facility])   Future Office visit:           None noted   Serotonin-Norepinephrine Reuptake Inhibitors  Failed - 7/8/2021  6:38 AM        Failed - Blood pressure under 140/90 in past 12 months     BP Readings from Last 3 Encounters:   06/07/19 124/82   05/07/18 126/70   02/08/17 126/66                 Failed - Recent (12 mo) or future (30 days) visit within the authorizing provider's specialty     Patient has had an office visit with the authorizing provider or a provider within the authorizing providers department within the previous 12 mos or has a future within next 30 days. See \"Patient Info\" tab in inbasket, or \"Choose Columns\" in Meds & Orders section of the refill encounter.              Failed - Normal serum creatinine on file in past 12 months     Recent Labs   Lab Test 01/18/16  0951   CR 0.98       Ok to refill medication if creatinine is low       Patient previously directed for annual review need, no appointment made. Routing for provider review and consideration of limited refill. Twyla Zapata RN ....................  7/9/2021   10:20 AM      "

## 2021-07-12 RX ORDER — VENLAFAXINE HYDROCHLORIDE 75 MG/1
CAPSULE, EXTENDED RELEASE ORAL
Qty: 30 CAPSULE | Refills: 0 | Status: SHIPPED | OUTPATIENT
Start: 2021-07-12 | End: 2021-08-25

## 2021-08-25 ENCOUNTER — OFFICE VISIT (OUTPATIENT)
Dept: FAMILY MEDICINE | Facility: OTHER | Age: 56
End: 2021-08-25
Attending: FAMILY MEDICINE
Payer: COMMERCIAL

## 2021-08-25 VITALS
HEIGHT: 70 IN | SYSTOLIC BLOOD PRESSURE: 132 MMHG | RESPIRATION RATE: 14 BRPM | OXYGEN SATURATION: 98 % | TEMPERATURE: 97.9 F | WEIGHT: 185 LBS | HEART RATE: 79 BPM | BODY MASS INDEX: 26.48 KG/M2 | DIASTOLIC BLOOD PRESSURE: 76 MMHG

## 2021-08-25 DIAGNOSIS — R73.09 ELEVATED GLUCOSE: ICD-10-CM

## 2021-08-25 DIAGNOSIS — Z12.5 SCREENING FOR PROSTATE CANCER: ICD-10-CM

## 2021-08-25 DIAGNOSIS — N52.8 OTHER MALE ERECTILE DYSFUNCTION: ICD-10-CM

## 2021-08-25 DIAGNOSIS — Z13.220 LIPID SCREENING: ICD-10-CM

## 2021-08-25 DIAGNOSIS — F41.1 GAD (GENERALIZED ANXIETY DISORDER): ICD-10-CM

## 2021-08-25 DIAGNOSIS — Z12.11 COLON CANCER SCREENING: ICD-10-CM

## 2021-08-25 DIAGNOSIS — Z00.00 ROUTINE GENERAL MEDICAL EXAMINATION AT A HEALTH CARE FACILITY: Primary | ICD-10-CM

## 2021-08-25 LAB
CHOLEST SERPL-MCNC: 223 MG/DL
FASTING STATUS PATIENT QL REPORTED: YES
FASTING STATUS PATIENT QL REPORTED: YES
GLUCOSE BLD-MCNC: 105 MG/DL (ref 70–105)
HBA1C MFR BLD: 6 % (ref 4–6.2)
HDLC SERPL-MCNC: 39 MG/DL (ref 23–92)
LDLC SERPL CALC-MCNC: 138 MG/DL
NONHDLC SERPL-MCNC: 184 MG/DL
PSA SERPL-MCNC: 0.72 UG/L (ref 0–4)
TRIGL SERPL-MCNC: 229 MG/DL

## 2021-08-25 PROCEDURE — 80061 LIPID PANEL: CPT | Mod: ZL | Performed by: FAMILY MEDICINE

## 2021-08-25 PROCEDURE — 83036 HEMOGLOBIN GLYCOSYLATED A1C: CPT | Mod: ZL | Performed by: FAMILY MEDICINE

## 2021-08-25 PROCEDURE — 36415 COLL VENOUS BLD VENIPUNCTURE: CPT | Mod: ZL | Performed by: FAMILY MEDICINE

## 2021-08-25 PROCEDURE — 84153 ASSAY OF PSA TOTAL: CPT | Mod: ZL | Performed by: FAMILY MEDICINE

## 2021-08-25 PROCEDURE — 82947 ASSAY GLUCOSE BLOOD QUANT: CPT | Mod: ZL | Performed by: FAMILY MEDICINE

## 2021-08-25 PROCEDURE — 99396 PREV VISIT EST AGE 40-64: CPT | Performed by: FAMILY MEDICINE

## 2021-08-25 RX ORDER — SILDENAFIL 100 MG/1
100 TABLET, FILM COATED ORAL DAILY PRN
Qty: 12 TABLET | Refills: 11 | Status: SHIPPED | OUTPATIENT
Start: 2021-08-25 | End: 2023-06-01

## 2021-08-25 RX ORDER — SILDENAFIL 100 MG/1
100 TABLET, FILM COATED ORAL DAILY PRN
Qty: 12 TABLET | Refills: 11 | Status: SHIPPED | OUTPATIENT
Start: 2021-08-25 | End: 2021-08-25

## 2021-08-25 RX ORDER — VENLAFAXINE HYDROCHLORIDE 150 MG/1
150 TABLET, EXTENDED RELEASE ORAL DAILY
Qty: 90 TABLET | Refills: 0 | Status: SHIPPED | OUTPATIENT
Start: 2021-08-25 | End: 2021-09-20

## 2021-08-25 RX ORDER — VENLAFAXINE HYDROCHLORIDE 75 MG/1
CAPSULE, EXTENDED RELEASE ORAL
Qty: 30 CAPSULE | Refills: 0 | Status: SHIPPED | OUTPATIENT
Start: 2021-08-25 | End: 2022-04-19

## 2021-08-25 RX ORDER — VENLAFAXINE HYDROCHLORIDE 37.5 MG/1
CAPSULE, EXTENDED RELEASE ORAL
Qty: 90 CAPSULE | Refills: 3 | Status: SHIPPED | OUTPATIENT
Start: 2021-08-25 | End: 2022-04-19

## 2021-08-25 ASSESSMENT — PATIENT HEALTH QUESTIONNAIRE - PHQ9
SUM OF ALL RESPONSES TO PHQ QUESTIONS 1-9: 3
5. POOR APPETITE OR OVEREATING: SEVERAL DAYS

## 2021-08-25 ASSESSMENT — ANXIETY QUESTIONNAIRES
5. BEING SO RESTLESS THAT IT IS HARD TO SIT STILL: NOT AT ALL
3. WORRYING TOO MUCH ABOUT DIFFERENT THINGS: SEVERAL DAYS
IF YOU CHECKED OFF ANY PROBLEMS ON THIS QUESTIONNAIRE, HOW DIFFICULT HAVE THESE PROBLEMS MADE IT FOR YOU TO DO YOUR WORK, TAKE CARE OF THINGS AT HOME, OR GET ALONG WITH OTHER PEOPLE: SOMEWHAT DIFFICULT
GAD7 TOTAL SCORE: 4
1. FEELING NERVOUS, ANXIOUS, OR ON EDGE: NOT AT ALL
6. BECOMING EASILY ANNOYED OR IRRITABLE: SEVERAL DAYS
7. FEELING AFRAID AS IF SOMETHING AWFUL MIGHT HAPPEN: NOT AT ALL
2. NOT BEING ABLE TO STOP OR CONTROL WORRYING: SEVERAL DAYS

## 2021-08-25 ASSESSMENT — MIFFLIN-ST. JEOR: SCORE: 1684.37

## 2021-08-25 ASSESSMENT — PAIN SCALES - GENERAL: PAINLEVEL: NO PAIN (0)

## 2021-08-25 NOTE — PROGRESS NOTES
SUBJECTIVE:   CC: Frank Arthur is an 55 year old male who presents for preventative health visit.       Patient has been advised of split billing requirements and indicates understanding: Yes  HPI  Some ongoing depression, worse with COVID changes too.       PHQ 2/8/2017 6/7/2019 8/25/2021   PHQ-9 Total Score 6 4 3   Q9: Thoughts of better off dead/self-harm past 2 weeks Not at all Not at all Not at all               Today's PHQ-2 Score:   PHQ-2 ( 1999 Pf[employer]) 5/7/2018   Q1: Little interest or pleasure in doing things 0   Q2: Feeling down, depressed or hopeless 0   PHQ-2 Score 0       Abuse: Current or Past(Physical, Sexual or Emotional)- No  Do you feel safe in your environment? Yes    Have you ever done Advance Care Planning? (For example, a Health Directive, POLST, or a discussion with a medical provider or your loved ones about your wishes): No, advance care planning information given to patient to review.  Advanced care planning was discussed at today's visit.    Social History     Tobacco Use     Smoking status: Never Smoker     Smokeless tobacco: Never Used   Substance Use Topics     Alcohol use: Yes         No flowsheet data found.    Last PSA: No results found for: PSA    Reviewed orders with patient. Reviewed health maintenance and updated orders accordingly - Yes  Lab work is in process  Labs reviewed in EPIC  Current Outpatient Medications   Medication Sig Dispense Refill     venlafaxine (EFFEXOR-ER) 150 MG 24 hr tablet TAKE 1 TABLET DAILY WITH BREAKFAST 90 tablet 0     venlafaxine (EFFEXOR-ER) 150 MG 24 hr tablet Take 1 tablet (150 mg) by mouth daily 15 each 0     venlafaxine (EFFEXOR-XR) 75 MG 24 hr capsule TAKE 1 CAPSULE DAILY ALONG WITH 150 MG  MG DAILY TOTAL (NEED APPOINTMENT FOR FURTHER REFILLS) 30 capsule 0     No Known Allergies    Reviewed and updated as needed this visit by clinical staff  Tobacco  Allergies  Meds   Med Hx    Soc Hx        Reviewed and updated as needed this  "visit by Provider                Past Medical History:   Diagnosis Date     Family history of malignant neoplasm of digestive organ     4/7/2016     Major depressive disorder, recurrent, mild (H)     No Comments Provided     Pain in wrist     No Comments Provided     Prediabetes     No Comments Provided      Past Surgical History:   Procedure Laterality Date     ARTHROSCOPY SHOULDER DECOMPRESSION Right           COLONOSCOPY  2005    father w/ colon cancer     COLONOSCOPY  04/07/2016          VASECTOMY      No Comments Provided       Review of Systems  CONSTITUTIONAL: NEGATIVE for fever, chills, change in weight  INTEGUMENTARY/SKIN: NEGATIVE for worrisome rashes, moles or lesions  EYES: NEGATIVE for vision changes or irritation  ENT: NEGATIVE for ear, mouth and throat problems  RESP: NEGATIVE for significant cough or SOB  CV: NEGATIVE for chest pain, palpitations or peripheral edema  GI: NEGATIVE for nausea, abdominal pain, heartburn, or change in bowel habits   male: negative for dysuria, hematuria, decreased urinary stream, erectile dysfunction, urethral discharge  MUSCULOSKELETAL: NEGATIVE for significant arthralgias or myalgia  NEURO: NEGATIVE for weakness, dizziness or paresthesias    OBJECTIVE:   /76   Pulse 79   Temp 97.9  F (36.6  C)   Resp 14   Ht 1.784 m (5' 10.25\")   Wt 83.9 kg (185 lb)   SpO2 98%   BMI 26.36 kg/m      Physical Exam  GENERAL: healthy, alert and no distress  EYES: Eyes grossly normal to inspection, PERRL and conjunctivae and sclerae normal  HENT: ear canals and TM's normal, nose and mouth without ulcers or lesions  NECK: no adenopathy, no asymmetry, masses, or scars and thyroid normal to palpation  RESP: lungs clear to auscultation - no rales, rhonchi or wheezes  CV: regular rate and rhythm, normal S1 S2, no S3 or S4, no murmur, click or rub, no peripheral edema and peripheral pulses strong  ABDOMEN: soft, nontender, no hepatosplenomegaly, no masses and bowel sounds " normal  MS: no gross musculoskeletal defects noted, no edema  SKIN: no suspicious lesions or rashes  NEURO: Normal strength and tone, mentation intact and speech normal  PSYCH: mentation appears normal, affect normal/bright    Diagnostic Test Results:  Labs reviewed in Epic  Results for orders placed or performed in visit on 08/25/21   Hemoglobin A1c     Status: Normal   Result Value Ref Range    Hemoglobin A1C 6.0 4.0 - 6.2 %   Glucose     Status: None   Result Value Ref Range    Glucose 105 70 - 105 mg/dL    Patient Fasting > 8hrs? Yes    PSA Screen GH     Status: Normal   Result Value Ref Range    Prostate Specific Antigen Screen 0.72 0.00 - 4.00 ug/L   Lipid Panel     Status: Abnormal   Result Value Ref Range    Cholesterol 223 (H) <200 mg/dL    Triglycerides 229 (H) <150 mg/dL    Direct Measure HDL 39 23 - 92 mg/dL    LDL Cholesterol Calculated 138 (H) <=100 mg/dL    Non HDL Cholesterol 184 (H) <130 mg/dL    Patient Fasting > 8hrs? Yes          ASSESSMENT/PLAN:       ICD-10-CM    1. Routine general medical examination at a health care facility  Z00.00    2. Screening for prostate cancer  Z12.5 PSA Screen GH   3. Elevated glucose  R73.09 Glucose     Hemoglobin A1c   4. Lipid screening  Z13.220 Lipid Panel   5. Colon cancer screening  Z12.11 Adult Gastro Ref - Procedure Only   6. RAFAELA (generalized anxiety disorder)  F41.1 venlafaxine (EFFEXOR-XR) 37.5 MG 24 hr capsule     venlafaxine (EFFEXOR-XR) 75 MG 24 hr capsule     venlafaxine (EFFEXOR-ER) 150 MG 24 hr tablet   7. Other male erectile dysfunction  N52.8 sildenafil (VIAGRA) 100 MG tablet     DISCONTINUED: sildenafil (VIAGRA) 100 MG tablet       Patient has been advised of split billing requirements and indicates understanding: Yes  COUNSELING:   Reviewed preventive health counseling, as reflected in patient instructions       Regular exercise       Healthy diet/nutrition       Colon cancer screening       Prostate cancer screening    Estimated body mass index  "is 26.36 kg/m  as calculated from the following:    Height as of this encounter: 1.784 m (5' 10.25\").    Weight as of this encounter: 83.9 kg (185 lb).     Weight management plan: Discussed healthy diet and exercise guidelines    He reports that he has never smoked. He has never used smokeless tobacco.      Counseling Resources:  ATP IV Guidelines  Pooled Cohorts Equation Calculator  FRAX Risk Assessment  ICSI Preventive Guidelines  Dietary Guidelines for Americans, 2010  USDA's MyPlate  ASA Prophylaxis  Lung CA Screening    Shemar Mckinney MD  [facility] AND [facility]  "

## 2021-08-25 NOTE — NURSING NOTE
"Coming in for a physical check up    Chief Complaint   Patient presents with     Physical     reorder procedure       Initial /76   Pulse 79   Temp 97.9  F (36.6  C)   Resp 14   Ht 1.784 m (5' 10.25\")   Wt 83.9 kg (185 lb)   SpO2 98%   BMI 26.36 kg/m   Estimated body mass index is 26.36 kg/m  as calculated from the following:    Height as of this encounter: 1.784 m (5' 10.25\").    Weight as of this encounter: 83.9 kg (185 lb).  Medication Reconciliation: complete.  FOOD SECURITY SCREENING QUESTIONS  Hunger Vital Signs:  Within the past 12 months we worried whether our food would run out before we got money to buy more. Never  Within the past 12 months the food we bought just didn't last and we didn't have money to get more. Never  Rose Hollingsworth LPN 8/25/2021 10:11 AM      Rose Hollingsworth LPN  "

## 2021-08-26 DIAGNOSIS — F41.1 GAD (GENERALIZED ANXIETY DISORDER): ICD-10-CM

## 2021-08-26 RX ORDER — VENLAFAXINE HYDROCHLORIDE 150 MG/1
150 CAPSULE, EXTENDED RELEASE ORAL DAILY
Qty: 90 CAPSULE | Refills: 0 | Status: SHIPPED | OUTPATIENT
Start: 2021-08-26 | End: 2021-11-24

## 2021-08-26 RX ORDER — VENLAFAXINE HYDROCHLORIDE 150 MG/1
150 TABLET, EXTENDED RELEASE ORAL DAILY
Qty: 90 TABLET | Refills: 0 | Status: CANCELLED | OUTPATIENT
Start: 2021-08-26

## 2021-08-26 ASSESSMENT — ANXIETY QUESTIONNAIRES: GAD7 TOTAL SCORE: 4

## 2021-08-26 NOTE — TELEPHONE ENCOUNTER
Office Follow-up    NAME: Autumn Encarnacion   :  1945  MRM:  [id number]    Date:  2020            Assessment:     Problem List  Date Reviewed: 2019          Codes Class Noted    CAD (coronary artery disease) ICD-10-CM: I25.10  ICD-9-CM: 414.00  2017        Severe obesity (BMI 35.0-39. 9) ICD-10-CM: E66.01  ICD-9-CM: 278.01  2018        Type 2 diabetes mellitus with nephropathy ([facility] [place] [facility] ICD-10-CM: E11.21  ICD-9-CM: 250.40, 583.81  2018        Stable angina ([facility] [place] [facility] ICD-10-CM: I20.8  ICD-9-CM: 413.9  9/15/2017        Advanced care planning/counseling discussion ICD-10-CM: Z71.89  ICD-9-CM: V65.49  2017    Overview Signed 2017 10:31 AM by Camila Edna     Patient states she is a DNR  17               Obstructive sleep apnea (adult) (pediatric) ICD-10-CM: A94.34  ICD-9-CM: 327.23  2014        Chronic back pain ICD-10-CM: M54.9, G89.29  ICD-9-CM: 724.5, 338.29  2010        HTN (hypertension) ICD-10-CM: I10  ICD-9-CM: 401.9  5/3/2010        DM type 2 (diabetes mellitus, type 2) ([facility] [place] [facility] ICD-10-CM: E11.9  ICD-9-CM: 250.00  5/3/2010    Overview Addendum 2019  9:48 AM by Shaylee Dowell MD     On Lantus  FTF for DM supplies 2014  Forms for [employer] DM supplies 2014  Last HgA1C 6.7 (19) at endocrinology              MI (myocardial infarction) [facility]) ICD-10-CM: I21.9  ICD-9-CM: 410.90  5/3/2010                 Plan:     1. CAD/LAD stent/RCA : No symptoms of angina. Continue medical management. Continue aspirin and Crestor along with metoprolol at current dosages. 2. Hypertension: Blood pressure is controlled. Continue metoprolol and Prinzide. 3. Dyslipidemia: Most recent fasting lipid profile is not available. Fasting lipid profile has been followed by Dr. Janice Cochran. Last LDL from 2018 was 82. Continue Crestor 5 mg p.o. daily. Strict control of blood sugars. 4. Diabetes: Most recent hemoglobin A1c is 6.0.   She is being currently treated by Pharmacy requesting Capsules ($14.45)instead of tablets ($397.11) due to cost. Dimple Kunz RN on 8/26/2021 at 10:57 AM     Dr. Amy Spear. 5. EMILY: CPAP. 6. See Dr. Yanique Travis in 1 year. Subjective:     Tyrell Hernandez, a 76y.o. year-old who presents for followup. She has known history of coronary disease with PCI to LAD in September 2017. She also has chronic total occlusion of the right coronary artery with left-to-right collaterals. On today's visit she is denying any symptoms of chest pain, shortness of breath, lightheadedness or dizziness. She is active and works. EKG in my office today demonstrated normal sinus rhythm, normal axis, normal intervals, normal ST segment. Exam:     Physical Exam:  Visit Vitals  /64 (BP 1 Location: Left arm, BP Patient Position: Sitting)   Pulse 80   Ht 5' 6\" (1.676 m)   Wt 214 lb (97.1 kg)   SpO2 98%   BMI 34.54 kg/m²     General appearance - alert, well appearing, and in no distress  Mental status - affect appropriate to mood  Eyes - sclera anicteric, moist mucous membranes  Neck - supple, no significant adenopathy  Chest - clear to auscultation, no wheezes, rales or rhonchi  Heart - normal rate, regular rhythm, normal S1, S2, no murmurs, rubs, clicks or gallops  Abdomen - soft, nontender, nondistended, no masses or organomegaly  Extremities - peripheral pulses normal, no pedal edema  Skin - normal coloration  no rashes    Medications:     Current Outpatient Medications   Medication Sig    lisinopril-hydroCHLOROthiazide (PRINZIDE, ZESTORETIC) 20-12.5 mg per tablet TAKE ONE TABLET BY MOUTH ONCE DAILY    rosuvastatin (CRESTOR) 5 mg tablet TAKE ONE TABLET BY MOUTH NIGHTLY    metoprolol succinate (TOPROL-XL) 100 mg tablet TAKE ONE TABLET BY MOUTH ONCE DAILY    insulin degludec (TRESIBA FLEXTOUCH U-100) 100 unit/mL (3 mL) inpn 60 Units by SubCUTAneous route daily.  semaglutide (OZEMPIC) 0.25 mg/0.2 mL (2 mg/1.5 mL) sub-q pen 0.25 mg by SubCUTAneous route every seven (7) days.     nitroglycerin (NITROSTAT) 0.4 mg SL tablet PLACE ONE TABLET UNDER THE TONGUE EVERY 5 MINUTES AS NEEDED FOR CHEST PAIN    aspirin 81 mg chewable tablet Take 81 mg by mouth daily.  co-enzyme Q-10 (CO Q-10) 100 mg capsule Take 1 Cap by mouth daily.  glucose blood VI test strips ([employer] BLOOD GLUCOSE SYSTEM) strip Use 3 times per day (fasting and before meals). (Patient taking differently: Once a day)    Lancets (ONE TOUCH ULTRASOFT LANCETS) Misc 1 Package by Does Not Apply route. Test blood glucose 3-4 time daily     No current facility-administered medications for this visit. Diagnostic Data Review:       9/20/17: CATH- LAD: p70% @ D1, dLAD:70%; LCX:m50%, RCA: mRCA:  supplied w/ collaterals from LAD  ---Unsuccessful PCI apical LAD: Attempted POBA with various balloons without success. --- s/p ELE (3x15, 2.75 x14 ELE overlapping) ->mLAD    9/18/17: STRESS CARDIOLITE- Poor exercise tolerance. Nml stress. Abnormal Exercise gated SPECT MPS: Small apical defect, worse on stress, Likely small apical infarct with mild hamlet-infarct ischemia. LVEF 64%    1997: 3800 NormalApp47- MI stents      Lab Review:     Lab Results   Component Value Date/Time    Cholesterol, total 176 04/06/2018 03:21 PM    HDL Cholesterol 76 04/06/2018 03:21 PM    LDL,Direct 158 (H) 01/17/2013 03:25 PM    LDL, calculated 82 04/06/2018 03:21 PM    Triglyceride 90 04/06/2018 03:21 PM    CHOL/HDL Ratio 3.4 09/21/2017 05:25 AM     Lab Results   Component Value Date/Time    Creatinine 1.90 (H) 01/31/2020 01:50 PM     Lab Results   Component Value Date/Time    BUN 27 (H) 01/31/2020 01:50 PM     Lab Results   Component Value Date/Time    Potassium 4.1 01/31/2020 01:50 PM     Lab Results   Component Value Date/Time    Hemoglobin A1c 9.0 (H) 09/01/2016 11:03 AM     Lab Results   Component Value Date/Time    HGB 12.6 01/31/2020 01:50 PM     Lab Results   Component Value Date/Time    PLATELET 055 46/66/7795 01:50 PM     No results for input(s): CPK, CKMB, TROIQ in the last 72 hours.     No lab exists for component: CKQMB, CPKMB             ___________________________________________________    Molina Human.  Ronel Allen MD, [facility] [place]

## 2021-10-03 ENCOUNTER — HEALTH MAINTENANCE LETTER (OUTPATIENT)
Age: 56
End: 2021-10-03

## 2021-11-10 ENCOUNTER — HOSPITAL ENCOUNTER (EMERGENCY)
Facility: OTHER | Age: 56
Discharge: HOME | End: 2021-11-10
Attending: PHYSICIAN ASSISTANT | Admitting: PHYSICIAN ASSISTANT
Payer: COMMERCIAL

## 2021-11-10 ENCOUNTER — APPOINTMENT (OUTPATIENT)
Dept: CT IMAGING | Facility: OTHER | Age: 56
End: 2021-11-10
Attending: PHYSICIAN ASSISTANT
Payer: COMMERCIAL

## 2021-11-10 VITALS
HEART RATE: 86 BPM | HEIGHT: 70 IN | DIASTOLIC BLOOD PRESSURE: 86 MMHG | TEMPERATURE: 97.4 F | RESPIRATION RATE: 19 BRPM | BODY MASS INDEX: 26.45 KG/M2 | SYSTOLIC BLOOD PRESSURE: 130 MMHG | WEIGHT: 184.75 LBS | OXYGEN SATURATION: 100 %

## 2021-11-10 DIAGNOSIS — E86.0 DEHYDRATION: ICD-10-CM

## 2021-11-10 DIAGNOSIS — R55 VASOVAGAL SYNCOPE: ICD-10-CM

## 2021-11-10 DIAGNOSIS — E83.42 HYPOMAGNESEMIA: ICD-10-CM

## 2021-11-10 LAB
ALBUMIN SERPL-MCNC: 4 G/DL (ref 3.5–5.7)
ALBUMIN UR-MCNC: NEGATIVE MG/DL
ALP SERPL-CCNC: 51 U/L (ref 34–104)
ALT SERPL W P-5'-P-CCNC: 16 U/L (ref 7–52)
ANION GAP SERPL CALCULATED.3IONS-SCNC: 10 MMOL/L (ref 3–14)
APPEARANCE UR: CLEAR
AST SERPL W P-5'-P-CCNC: 16 U/L (ref 13–39)
BASOPHILS # BLD AUTO: 0 10E3/UL (ref 0–0.2)
BASOPHILS NFR BLD AUTO: 1 %
BILIRUB SERPL-MCNC: 0.3 MG/DL (ref 0.3–1)
BILIRUB UR QL STRIP: NEGATIVE
BUN SERPL-MCNC: 15 MG/DL (ref 7–25)
CALCIUM SERPL-MCNC: 8.9 MG/DL (ref 8.6–10.3)
CHLORIDE BLD-SCNC: 105 MMOL/L (ref 98–107)
CO2 SERPL-SCNC: 25 MMOL/L (ref 21–31)
COLOR UR AUTO: YELLOW
CREAT SERPL-MCNC: 1.07 MG/DL (ref 0.7–1.3)
CRP SERPL-MCNC: 1.1 MG/L
D DIMER PPP FEU-MCNC: <=0.27 UG/ML FEU (ref 0–0.5)
EOSINOPHIL # BLD AUTO: 0.2 10E3/UL (ref 0–0.7)
EOSINOPHIL NFR BLD AUTO: 2 %
ERYTHROCYTE [DISTWIDTH] IN BLOOD BY AUTOMATED COUNT: 12.8 % (ref 10–15)
ERYTHROCYTE [SEDIMENTATION RATE] IN BLOOD BY WESTERGREN METHOD: 2 MM/HR (ref 0–20)
GFR SERPL CREATININE-BSD FRML MDRD: 77 ML/MIN/1.73M2
GLUCOSE BLD-MCNC: 130 MG/DL (ref 70–105)
GLUCOSE UR STRIP-MCNC: NEGATIVE MG/DL
GRANULAR CAST: 5 /LPF
HCT VFR BLD AUTO: 38.4 % (ref 40–53)
HGB BLD-MCNC: 13.1 G/DL (ref 13.3–17.7)
HGB UR QL STRIP: NEGATIVE
HYALINE CASTS: 4 /LPF
IMM GRANULOCYTES # BLD: 0 10E3/UL
IMM GRANULOCYTES NFR BLD: 0 %
KETONES UR STRIP-MCNC: NEGATIVE MG/DL
LACTATE SERPL-SCNC: 2 MMOL/L (ref 0.7–2)
LEUKOCYTE ESTERASE UR QL STRIP: NEGATIVE
LYMPHOCYTES # BLD AUTO: 2 10E3/UL (ref 0.8–5.3)
LYMPHOCYTES NFR BLD AUTO: 25 %
MAGNESIUM SERPL-MCNC: 1.9 MG/DL (ref 1.9–2.7)
MCH RBC QN AUTO: 30.5 PG (ref 26.5–33)
MCHC RBC AUTO-ENTMCNC: 34.1 G/DL (ref 31.5–36.5)
MCV RBC AUTO: 89 FL (ref 78–100)
MONOCYTES # BLD AUTO: 0.4 10E3/UL (ref 0–1.3)
MONOCYTES NFR BLD AUTO: 6 %
MUCOUS THREADS #/AREA URNS LPF: PRESENT /LPF
NEUTROPHILS # BLD AUTO: 5.3 10E3/UL (ref 1.6–8.3)
NEUTROPHILS NFR BLD AUTO: 66 %
NITRATE UR QL: NEGATIVE
NRBC # BLD AUTO: 0 10E3/UL
NRBC BLD AUTO-RTO: 0 /100
NT-PROBNP SERPL-MCNC: 5 PG/ML (ref 0–100)
PH UR STRIP: 6 [PH] (ref 5–9)
PLATELET # BLD AUTO: 307 10E3/UL (ref 150–450)
POTASSIUM BLD-SCNC: 3.5 MMOL/L (ref 3.5–5.1)
PROT SERPL-MCNC: 5.9 G/DL (ref 6.4–8.9)
RBC # BLD AUTO: 4.3 10E6/UL (ref 4.4–5.9)
RBC URINE: 1 /HPF
SODIUM SERPL-SCNC: 140 MMOL/L (ref 134–144)
SP GR UR STRIP: 1.02 (ref 1–1.03)
TROPONIN I SERPL-MCNC: 3.9 PG/ML (ref 0–34)
TSH SERPL DL<=0.005 MIU/L-ACNC: 2.78 MU/L (ref 0.4–4)
UROBILINOGEN UR STRIP-MCNC: NORMAL MG/DL
WBC # BLD AUTO: 7.9 10E3/UL (ref 4–11)
WBC URINE: 2 /HPF

## 2021-11-10 PROCEDURE — 83605 ASSAY OF LACTIC ACID: CPT | Performed by: PHYSICIAN ASSISTANT

## 2021-11-10 PROCEDURE — 93010 ELECTROCARDIOGRAM REPORT: CPT | Performed by: INTERNAL MEDICINE

## 2021-11-10 PROCEDURE — 96360 HYDRATION IV INFUSION INIT: CPT | Performed by: PHYSICIAN ASSISTANT

## 2021-11-10 PROCEDURE — 87040 BLOOD CULTURE FOR BACTERIA: CPT | Performed by: PHYSICIAN ASSISTANT

## 2021-11-10 PROCEDURE — 80053 COMPREHEN METABOLIC PANEL: CPT | Performed by: PHYSICIAN ASSISTANT

## 2021-11-10 PROCEDURE — 81001 URINALYSIS AUTO W/SCOPE: CPT | Performed by: PHYSICIAN ASSISTANT

## 2021-11-10 PROCEDURE — 83880 ASSAY OF NATRIURETIC PEPTIDE: CPT | Performed by: PHYSICIAN ASSISTANT

## 2021-11-10 PROCEDURE — 250N000013 HC RX MED GY IP 250 OP 250 PS 637: Performed by: PHYSICIAN ASSISTANT

## 2021-11-10 PROCEDURE — 99283 EMERGENCY DEPT VISIT LOW MDM: CPT | Performed by: PHYSICIAN ASSISTANT

## 2021-11-10 PROCEDURE — 83735 ASSAY OF MAGNESIUM: CPT | Performed by: PHYSICIAN ASSISTANT

## 2021-11-10 PROCEDURE — 99285 EMERGENCY DEPT VISIT HI MDM: CPT | Mod: 25 | Performed by: PHYSICIAN ASSISTANT

## 2021-11-10 PROCEDURE — 86140 C-REACTIVE PROTEIN: CPT | Performed by: PHYSICIAN ASSISTANT

## 2021-11-10 PROCEDURE — 258N000003 HC RX IP 258 OP 636: Performed by: PHYSICIAN ASSISTANT

## 2021-11-10 PROCEDURE — 85652 RBC SED RATE AUTOMATED: CPT | Performed by: PHYSICIAN ASSISTANT

## 2021-11-10 PROCEDURE — 93005 ELECTROCARDIOGRAM TRACING: CPT | Performed by: PHYSICIAN ASSISTANT

## 2021-11-10 PROCEDURE — 85025 COMPLETE CBC W/AUTO DIFF WBC: CPT | Performed by: PHYSICIAN ASSISTANT

## 2021-11-10 PROCEDURE — 84443 ASSAY THYROID STIM HORMONE: CPT | Performed by: PHYSICIAN ASSISTANT

## 2021-11-10 PROCEDURE — 70450 CT HEAD/BRAIN W/O DYE: CPT | Mod: TC

## 2021-11-10 PROCEDURE — 84484 ASSAY OF TROPONIN QUANT: CPT | Performed by: PHYSICIAN ASSISTANT

## 2021-11-10 PROCEDURE — 85379 FIBRIN DEGRADATION QUANT: CPT | Performed by: PHYSICIAN ASSISTANT

## 2021-11-10 PROCEDURE — 36415 COLL VENOUS BLD VENIPUNCTURE: CPT | Performed by: PHYSICIAN ASSISTANT

## 2021-11-10 RX ORDER — SODIUM CHLORIDE 9 MG/ML
INJECTION, SOLUTION INTRAVENOUS CONTINUOUS
Status: DISCONTINUED | OUTPATIENT
Start: 2021-11-10 | End: 2021-11-11 | Stop reason: HOSPADM

## 2021-11-10 RX ORDER — MAGNESIUM OXIDE 400 MG/1
800 TABLET ORAL ONCE
Status: COMPLETED | OUTPATIENT
Start: 2021-11-10 | End: 2021-11-10

## 2021-11-10 RX ADMIN — Medication 800 MG: at 22:25

## 2021-11-10 RX ADMIN — SODIUM CHLORIDE 1000 ML: 9 INJECTION, SOLUTION INTRAVENOUS at 21:59

## 2021-11-10 ASSESSMENT — ENCOUNTER SYMPTOMS
VOMITING: 0
CONFUSION: 0
NAUSEA: 0
CONSTIPATION: 0
DIARRHEA: 0
AGITATION: 0
TREMORS: 0
FACIAL SWELLING: 0
EYE PAIN: 0
FLANK PAIN: 0
SEIZURES: 0
BACK PAIN: 0
STRIDOR: 0
ABDOMINAL PAIN: 0
FEVER: 0
FACIAL ASYMMETRY: 0

## 2021-11-10 ASSESSMENT — MIFFLIN-ST. JEOR: SCORE: 1674.25

## 2021-11-11 LAB
ATRIAL RATE - MUSE: 77 BPM
DIASTOLIC BLOOD PRESSURE - MUSE: NORMAL MMHG
INTERPRETATION ECG - MUSE: NORMAL
P AXIS - MUSE: 70 DEGREES
PR INTERVAL - MUSE: 142 MS
QRS DURATION - MUSE: 92 MS
QT - MUSE: 436 MS
QTC - MUSE: 493 MS
R AXIS - MUSE: 52 DEGREES
SYSTOLIC BLOOD PRESSURE - MUSE: NORMAL MMHG
T AXIS - MUSE: 69 DEGREES
VENTRICULAR RATE- MUSE: 77 BPM

## 2021-11-11 NOTE — ED PROVIDER NOTES
History     Chief Complaint   Patient presents with     Neurologic Problem     Syncope     Abdominal Pain     HPI  Frank Arthur is a 56 year old male who was at [facility] when he apparently became pale diaphoretic and passed out.  EMS was called.  They were called for possible stroke.  The patient reports [place] has been [place] [facility] [employer] all day today and has only had 1 meal.  He has been drinking lots of coffee but has not drink much water.  He admits he may be dehydrated.  He has been having severe headache as well as abdominal pain.  Allergies:  No Known Allergies    Problem List:    Patient Active Problem List    Diagnosis Date Noted     Other male erectile dysfunction 08/25/2021     Priority: Medium     Family history of colon cancer requiring screening colonoscopy 04/07/2016     Priority: Medium     Major depressive disorder, recurrent, mild (H) 01/18/2016     Priority: Medium     Prediabetes 01/04/2016     Priority: Medium     Glottic insufficiency 12/05/2014     Priority: Medium     LPRD (laryngopharyngeal reflux disease) 10/03/2014     Priority: Medium     Muscle tension dysphonia 10/03/2014     Priority: Medium     Wrist pain 07/24/2013     Priority: Medium        Past Medical History:    Past Medical History:   Diagnosis Date     Family history of malignant neoplasm of digestive organ      Major depressive disorder, recurrent, mild (H)      Pain in wrist      Prediabetes        Past Surgical History:    Past Surgical History:   Procedure Laterality Date     ARTHROSCOPY SHOULDER DECOMPRESSION Right           COLONOSCOPY  2005    father w/ colon cancer     COLONOSCOPY  04/07/2016          VASECTOMY      No Comments Provided       Family History:    No family history on file.    Social History:  Marital Status:   [2]  Social History     Tobacco Use     Smoking status: Never Smoker     Smokeless tobacco: Never Used   Vaping Use     Vaping Use: Never used   Substance Use Topics     Alcohol use: Yes  "    Drug use: Never     Comment: Drug use: No        Medications:    sildenafil (VIAGRA) 100 MG tablet  venlafaxine (EFFEXOR-ER) 150 MG 24 hr tablet  venlafaxine (EFFEXOR-XR) 150 MG 24 hr capsule  venlafaxine (EFFEXOR-XR) 37.5 MG 24 hr capsule  venlafaxine (EFFEXOR-XR) 75 MG 24 hr capsule          Review of Systems   Constitutional: Negative for fever.   HENT: Negative for drooling and facial swelling.    Eyes: Negative for pain and visual disturbance.   Respiratory: Negative for stridor.    Cardiovascular: Negative for chest pain.   Gastrointestinal: Negative for abdominal pain, constipation, diarrhea, nausea and vomiting.   Genitourinary: Negative for flank pain.   Musculoskeletal: Negative for back pain.   Skin: Negative for pallor.   Neurological: Positive for syncope. Negative for tremors, seizures and facial asymmetry.   Psychiatric/Behavioral: Negative for agitation and confusion.   All other systems reviewed and are negative.      Physical Exam   BP: 123/82  Pulse: 78  Temp: 97.4  F (36.3  C)  Resp: 20  Height: 177.8 cm (5' 10\")  Weight: 83.8 kg (184 lb 11.9 oz)  SpO2: 95 %      Physical Exam  Vitals and nursing note reviewed.   Constitutional:       General: He is not in acute distress.     Appearance: Normal appearance. He is not ill-appearing or toxic-appearing.   HENT:      Head: Normocephalic. No raccoon eyes, right periorbital erythema or left periorbital erythema.      Right Ear: No drainage or tenderness.      Left Ear: No drainage or tenderness.      Nose: Nose normal.   Eyes:      General: Lids are normal. Gaze aligned appropriately. No scleral icterus.     Extraocular Movements: Extraocular movements intact.   Neck:      Trachea: No tracheal deviation.   Cardiovascular:      Rate and Rhythm: Normal rate.   Pulmonary:      Effort: Pulmonary effort is normal. No respiratory distress.      Breath sounds: No stridor. No wheezing.      Comments: Lung sounds are clear.  SaO2 is 97% on room air.  He is " not appear to be in any respiratory distress.  No tachypnea.  Abdominal:      Tenderness: There is no abdominal tenderness.   Musculoskeletal:         General: No deformity or signs of injury. Normal range of motion.      Cervical back: Normal range of motion. No signs of trauma.   Skin:     General: Skin is warm and dry.      Coloration: Skin is not jaundiced or pale.   Neurological:      General: No focal deficit present.      Mental Status: He is alert and oriented to person, place, and time.      GCS: GCS eye subscore is 4. GCS verbal subscore is 5. GCS motor subscore is 6.      Motor: No tremor or seizure activity.   Psychiatric:         Attention and Perception: Attention normal.         Mood and Affect: Mood normal.         ED Course     EKG shows normal sinus rhythm with a prolonged QT heart rate is 77.      Results for orders placed or performed during the hospital encounter of 11/10/21 (from the past 24 hour(s))   CBC with platelets differential    Narrative    The following orders were created for panel order CBC with platelets differential.  Procedure                               Abnormality         Status                     ---------                               -----------         ------                     CBC with platelets and d...[[id number]]  Abnormal            Final result                 Please view results for these tests on the individual orders.   D dimer quantitative   Result Value Ref Range    D-Dimer Quantitative <=0.27 0.00 - 0.50 ug/mL FEU    Narrative    This D-dimer assay is intended for use in conjunction with a clinical pretest probability assessment model to exclude pulmonary embolism (PE) and deep venous thrombosis (DVT) in outpatients suspected of PE or DVT. The cut-off value is 0.50 ug/mL FEU.   Comprehensive metabolic panel   Result Value Ref Range    Sodium 140 134 - 144 mmol/L    Potassium 3.5 3.5 - 5.1 mmol/L    Chloride 105 98 - 107 mmol/L    Carbon Dioxide (CO2) 25 21 -  31 mmol/L    Anion Gap 10 3 - 14 mmol/L    Urea Nitrogen 15 7 - 25 mg/dL    Creatinine 1.07 0.70 - 1.30 mg/dL    Calcium 8.9 8.6 - 10.3 mg/dL    Glucose 130 (H) 70 - 105 mg/dL    Alkaline Phosphatase 51 34 - 104 U/L    AST 16 13 - 39 U/L    ALT 16 7 - 52 U/L    Protein Total 5.9 (L) 6.4 - 8.9 g/dL    Albumin 4.0 3.5 - 5.7 g/dL    Bilirubin Total 0.3 0.3 - 1.0 mg/dL    GFR Estimate 77 >60 mL/min/1.73m2   Lactic acid whole blood   Result Value Ref Range    Lactic Acid 2.0 0.7 - 2.0 mmol/L   Troponin I   Result Value Ref Range    Troponin I 3.9 0.0 - 34.0 pg/mL   Magnesium   Result Value Ref Range    Magnesium 1.9 1.9 - 2.7 mg/dL   CRP inflammation   Result Value Ref Range    CRP Inflammation 1.1 <10.0 mg/L   Erythrocyte sedimentation rate auto   Result Value Ref Range    Erythrocyte Sedimentation Rate 2 0 - 20 mm/hr   Nt probnp inpatient (BNP)   Result Value Ref Range    N terminal Pro BNP Inpatient 5 0 - 100 pg/mL   TSH Reflex GH   Result Value Ref Range    TSH 2.78 0.40 - 4.00 mU/L   CBC with platelets and differential   Result Value Ref Range    WBC Count 7.9 4.0 - 11.0 10e3/uL    RBC Count 4.30 (L) 4.40 - 5.90 10e6/uL    Hemoglobin 13.1 (L) 13.3 - 17.7 g/dL    Hematocrit 38.4 (L) 40.0 - 53.0 %    MCV 89 78 - 100 fL    MCH 30.5 26.5 - 33.0 pg    MCHC 34.1 31.5 - 36.5 g/dL    RDW 12.8 10.0 - 15.0 %    Platelet Count 307 150 - 450 10e3/uL    % Neutrophils 66 %    % Lymphocytes 25 %    % Monocytes 6 %    % Eosinophils 2 %    % Basophils 1 %    % Immature Granulocytes 0 %    NRBCs per 100 WBC 0 <1 /100    Absolute Neutrophils 5.3 1.6 - 8.3 10e3/uL    Absolute Lymphocytes 2.0 0.8 - 5.3 10e3/uL    Absolute Monocytes 0.4 0.0 - 1.3 10e3/uL    Absolute Eosinophils 0.2 0.0 - 0.7 10e3/uL    Absolute Basophils 0.0 0.0 - 0.2 10e3/uL    Absolute Immature Granulocytes 0.0 <=0.0 10e3/uL    Absolute NRBCs 0.0 10e3/uL   CT Head w/o Contrast    Narrative    PROCEDURE INFORMATION:   Exam: CT Head Without Contrast   Exam date and time:  11/10/2021 9:44 PM   Age: 56 years old   Clinical indication: Altered mental status/memory loss     TECHNIQUE:   Imaging protocol: Computed tomography of the head without contrast.   Total images: 165   Radiation optimization: All CT scans at this facility use at least one of these   dose optimization techniques: automated exposure control; mA and/or kV   adjustment per patient size (includes targeted exams where dose is matched to   clinical indication); or iterative reconstruction.     COMPARISON:   No relevant prior studies available.     FINDINGS:   Brain: Normal. No hemorrhage. Unremarkable white matter. No mass effect.   Cerebral ventricles: No ventriculomegaly.   Paranasal sinuses: Marked opacification right maxillary sinus. Minimal   opacification ethmoid air cells. Minimal mucosal thickening sphenoid sinus.   Mastoid air cells: Visualized mastoid air cells are well aerated.   Bones/joints: Unremarkable. No acute fracture.   Soft tissues: Unremarkable.       Impression    IMPRESSION:   1. No acute intracranial process.   2. Paranasal sinus disease.     THIS DOCUMENT HAS BEEN ELECTRONICALLY SIGNED BY PRABHU LOBO MD   UA with Microscopic reflex to Culture    Specimen: Urine, Clean Catch   Result Value Ref Range    Color Urine Yellow Colorless, Straw, Light Yellow, Yellow    Appearance Urine Clear Clear    Glucose Urine Negative Negative mg/dL    Bilirubin Urine Negative Negative    Ketones Urine Negative Negative mg/dL    Specific Gravity Urine 1.025 1.005 - 1.030    Blood Urine Negative Negative    pH Urine 6.0 5.0 - 9.0    Protein Albumin Urine Negative Negative mg/dL    Urobilinogen Urine Normal Normal, 2.0 mg/dL    Nitrite Urine Negative Negative    Leukocyte Esterase Urine Negative Negative    Mucus Urine Present (A) None Seen /LPF    RBC Urine 1 <=2 /HPF    WBC Urine 2 <=5 /HPF    Hyaline Casts Urine 4 (H) <=2 /LPF    Granular Casts Urine 5 (H) None Seen /LPF    Narrative    Urine Culture not indicated        Medications   0.9% sodium chloride BOLUS ( Intravenous Canceled Entry 11/10/21 2200)     Followed by   0.9% sodium chloride BOLUS (0 mLs Intravenous Stopped 11/10/21 2257)     Followed by   sodium chloride 0.9% infusion (has no administration in time range)   magnesium oxide (MAG-OX) tablet 800 mg (800 mg Oral Given 11/10/21 2225)       Assessments & Plan (with Medical Decision Making)     EKG shows normal sinus rhythm with a prolonged QT.  Heart rate is 77    I have reviewed the nursing notes.    I have reviewed the findings, diagnosis, plan and need for follow up with the patient.      Discharge Medication List as of 11/10/2021 10:58 PM          Final diagnoses:   Vasovagal syncope   Dehydration   Hypomagnesemia     Afebrile.  Vital signs stable.  Patient with an apparent syncopal episode while at [employer].  He apparently has had very little to eat today and has been drinking excessive amounts of coffee.  He feels weak and fatigued.  EKG shows normal sinus rhythm with a prolonged QT heart rate is 77.  Troponin is normal.  D-dimer is normal.  BMP is normal.  CBC shows normal white blood cells no left shift.  CMP is unremarkable.  Glucose 130.   His magnesium is low end of normal at 1.9 and he was given magnesium oxide tablet.  TSH is normal.  CRP is normal.  TSH is normal.  Blood cultures are pending.  Lactic acid at the high end of normal at 2.0 most likely due to his dehydration.  CT of his head shows no acute finding but show some Paranasal sinus disease..  He was observed with cardiac telemetry over extended timeframe in the ER with no signs of cardiac arrhythmias.  I did discuss further evaluation to include a Zio patch or Holter monitoring but they have declined this at this time.  He is feeling much better with the above treatment.  Most likely vasovagal syncope and dehydration due to extremely poor diet today.  Follow-up with his primary care provider if symptoms persist for further evaluation  as needed.          11/10/2021   [facility]     Gustavo Branch PA-C  11/10/21 [id number]

## 2021-11-11 NOTE — ED TRIAGE NOTES
EMS Arrival Note  ________________________________  Frank Arthur is a 56 year old Male that arrives via [facility] ambulance service from [facility]  Pre hospital clinical presentation per patient  and EMS personnel includes Pt was at [facility] when he was having a conversation and became pale, diaphoretic, and passed out. EMS was called for a possible stroke. Upon arrival, pt was in and out of consciousness and c/o severe headache and abd pain.    Pre hospital personnel report vital signs of:  B/P 1010/72  Pre Hospital Cardiac rhythm reported as Normal Sinus    Patient arrives with:  GCS Total = 15  Airway intact  Breathing Assessment Normal  Circulation Assessment Normal  Patient arrives with a 18 gauge IV at his right anticubital    Placed in room 901, gowned, warm blanket provided, side rails up,  ID verified and band placed, and call light within reach.       Previous living situation Spouse

## 2021-11-16 LAB
BACTERIA BLD CULT: NO GROWTH
BACTERIA BLD CULT: NO GROWTH

## 2021-11-23 DIAGNOSIS — F41.1 GAD (GENERALIZED ANXIETY DISORDER): ICD-10-CM

## 2021-11-24 RX ORDER — VENLAFAXINE HYDROCHLORIDE 150 MG/1
CAPSULE, EXTENDED RELEASE ORAL
Qty: 90 CAPSULE | Refills: 3 | Status: SHIPPED | OUTPATIENT
Start: 2021-11-24 | End: 2022-04-19

## 2021-11-24 NOTE — TELEPHONE ENCOUNTER
"[facility]  sent Rx request for the following:      Requested Prescriptions   Pending Prescriptions Disp Refills     venlafaxine (EFFEXOR-XR) 150 MG 24 hr capsule [Pharmacy Med Name: VENLAFAXINE HCL ER CAPS 150MG] 90 capsule 3     Sig: TAKE 1 CAPSULE DAILY       Serotonin-Norepinephrine Reuptake Inhibitors  Passed - 11/23/2021 11:41 PM        Passed - Blood pressure under 140/90 in past 12 months     BP Readings from Last 3 Encounters:   11/10/21 130/86   08/25/21 132/76   06/07/19 124/82           Passed - Recent (12 mo) or future (30 days) visit within the authorizing provider's specialty     Patient has had an office visit with the authorizing provider or a provider within the authorizing providers department within the previous 12 mos or has a future within next 30 days. See \"Patient Info\" tab in inbasket, or \"Choose Columns\" in Meds & Orders section of the refill encounter.          Passed - Medication is active on med list        Passed - Patient is age 18 or older        Passed - Normal serum creatinine on file in past 12 months     Recent Labs   Lab Test 11/10/21  2123   CR 1.07     Ok to refill medication if creatinine is low       Last Prescription Date:   9/20/21  Last Fill Qty/Refills:         1, R-0  Last Office Visit:              8/25/21  Future Office visit:           None    In clinical absence of patient's primary, Shemar Mckinney, patient is requesting that this message be sent to the covering provider for consideration please.  He is scheduled to return 11/26/21.    Routing refill request to provider for review/approval because:  Unable to complete prescription refill per RN Medication Refill Policy. Lucy Talavera RN .............. 11/24/2021  9:12 AM  "

## 2022-02-11 ENCOUNTER — MYC MEDICAL ADVICE (OUTPATIENT)
Dept: FAMILY MEDICINE | Facility: OTHER | Age: 57
End: 2022-02-11
Payer: COMMERCIAL

## 2022-02-11 DIAGNOSIS — A09 TRAVELER'S DIARRHEA: Primary | ICD-10-CM

## 2022-02-15 RX ORDER — CIPROFLOXACIN 500 MG/1
500 TABLET, FILM COATED ORAL 2 TIMES DAILY
Qty: 28 TABLET | Refills: 0 | Status: SHIPPED | OUTPATIENT
Start: 2022-02-15 | End: 2022-03-01

## 2022-04-04 ENCOUNTER — MYC MEDICAL ADVICE (OUTPATIENT)
Dept: FAMILY MEDICINE | Facility: OTHER | Age: 57
End: 2022-04-04
Payer: COMMERCIAL

## 2022-04-04 DIAGNOSIS — F41.1 GAD (GENERALIZED ANXIETY DISORDER): Primary | ICD-10-CM

## 2022-04-04 RX ORDER — LORAZEPAM 1 MG/1
1 TABLET ORAL EVERY 6 HOURS PRN
Qty: 30 TABLET | Refills: 0 | Status: SHIPPED | OUTPATIENT
Start: 2022-04-04 | End: 2023-02-20

## 2022-04-04 RX ORDER — HYDROXYZINE PAMOATE 25 MG/1
25 CAPSULE ORAL 3 TIMES DAILY PRN
Qty: 30 CAPSULE | Refills: 4 | Status: SHIPPED | OUTPATIENT
Start: 2022-04-04 | End: 2023-02-20

## 2022-04-13 ENCOUNTER — MYC MEDICAL ADVICE (OUTPATIENT)
Dept: FAMILY MEDICINE | Facility: OTHER | Age: 57
End: 2022-04-13
Payer: COMMERCIAL

## 2022-04-13 DIAGNOSIS — F41.1 GAD (GENERALIZED ANXIETY DISORDER): Primary | ICD-10-CM

## 2022-05-02 ENCOUNTER — OFFICE VISIT (OUTPATIENT)
Dept: FAMILY MEDICINE | Facility: OTHER | Age: 57
End: 2022-05-02
Attending: FAMILY MEDICINE
Payer: COMMERCIAL

## 2022-05-02 VITALS
HEART RATE: 80 BPM | SYSTOLIC BLOOD PRESSURE: 142 MMHG | DIASTOLIC BLOOD PRESSURE: 86 MMHG | BODY MASS INDEX: 26.11 KG/M2 | RESPIRATION RATE: 18 BRPM | WEIGHT: 182 LBS

## 2022-05-02 DIAGNOSIS — F41.0 PANIC ATTACK: Primary | ICD-10-CM

## 2022-05-02 DIAGNOSIS — F41.1 GAD (GENERALIZED ANXIETY DISORDER): ICD-10-CM

## 2022-05-02 PROCEDURE — 99213 OFFICE O/P EST LOW 20 MIN: CPT | Performed by: FAMILY MEDICINE

## 2022-05-02 ASSESSMENT — PAIN SCALES - GENERAL: PAINLEVEL: NO PAIN (0)

## 2022-05-02 ASSESSMENT — ANXIETY QUESTIONNAIRES
7. FEELING AFRAID AS IF SOMETHING AWFUL MIGHT HAPPEN: MORE THAN HALF THE DAYS
GAD7 TOTAL SCORE: 16
3. WORRYING TOO MUCH ABOUT DIFFERENT THINGS: NEARLY EVERY DAY
1. FEELING NERVOUS, ANXIOUS, OR ON EDGE: NEARLY EVERY DAY
GAD7 TOTAL SCORE: 16
6. BECOMING EASILY ANNOYED OR IRRITABLE: SEVERAL DAYS
2. NOT BEING ABLE TO STOP OR CONTROL WORRYING: NEARLY EVERY DAY
GAD7 TOTAL SCORE: 16
4. TROUBLE RELAXING: NEARLY EVERY DAY
5. BEING SO RESTLESS THAT IT IS HARD TO SIT STILL: SEVERAL DAYS
7. FEELING AFRAID AS IF SOMETHING AWFUL MIGHT HAPPEN: MORE THAN HALF THE DAYS

## 2022-05-02 NOTE — PROGRESS NOTES
"  Assessment & Plan     (F41.0) Panic attack  (primary encounter diagnosis)  Comment: he has fears/anxiety about the ativan and is not really using it.  Has some relief simply having it, which is common.  I strongly advised counseling, which he is ready to pursue.  We also talked about boxed breathing and other short term skills.  Plan:      (F41.1) RAFAELA (generalized anxiety disorder)  Comment: the effexor was likely treating this more than he realized when he stopped it, which he is not acknowledging.  Zoloft will take another 2 weeks or more to become fully effective. Can contact me if he wants to increase it.  Safe to continue with the atarax as needed for now too.   Plan:               BMI:   Estimated body mass index is 26.11 kg/m  as calculated from the following:    Height as of 11/10/21: 1.778 m (5' 10\").    Weight as of this encounter: 82.6 kg (182 lb).           Return if symptoms worsen or fail to improve.    Shemar Mckinney MD  [facility]    Subjective   Frank is a 56 year old who presents for the following health issues  accompanied by his self.    History of Present Illness       Mental Health Follow-up:  Patient presents to follow-up on Anxiety.    Patient's anxiety since last visit has been:  Worse  The patient is not having other symptoms associated with anxiety.  Any significant life events: job concerns and grief or loss  Patient is feeling anxious or having panic attacks.  Patient has no concerns about alcohol or drug use.         Today's RAFAELA-7 Score: 16    Reason for visit:  Anxiety med management  Symptom onset:  More than a month  Symptom intensity:  Moderate  Symptom progression:  Staying the same  Had these symptoms before:  No  What makes it worse:  More stress. Fear of going to sleep  What makes it better:  Breathing;    He eats 4 or more servings of fruits and vegetables daily.He consumes 1 sweetened beverage(s) daily.He exercises with enough effort to increase his " heart rate 20 to 29 minutes per day.  He exercises with enough effort to increase his heart rate 6 days per week.   He is taking medications regularly.   anxiety follow up.  Had been on effexor over 20 years. Weaned off this slowly.  Anxiety was still bad.  Got ativan, atarax and ultimately zoloft now.  Mom  22 from cancer, expected. He had a panic attack 2 weeks later.  Was in [place] on vacation. At night felt severe shortness of breath.  Every night there.  Has used just a few ativan tabs. Atarax most nights now, which helps him fall asleep.  On zoloft for 2 weeks and cannot tell much difference yet.    Current Outpatient Medications   Medication     hydrOXYzine (VISTARIL) 25 MG capsule     LORazepam (ATIVAN) 1 MG tablet     sertraline (ZOLOFT) 50 MG tablet     sildenafil (VIAGRA) 100 MG tablet     No current facility-administered medications for this visit.               Review of Systems         Objective    BP (!) 142/86 (BP Location: Right arm, Patient Position: Sitting, Cuff Size: Adult Regular)   Pulse 80   Resp 18   Wt 82.6 kg (182 lb)   BMI 26.11 kg/m    Body mass index is 26.11 kg/m .  Physical Exam  Constitutional:       Appearance: Normal appearance.   Neurological:      Mental Status: He is alert.   Psychiatric:         Mood and Affect: Mood normal.         Behavior: Behavior normal.         Thought Content: Thought content normal.

## 2022-05-02 NOTE — NURSING NOTE
"Chief Complaint   Patient presents with     Anxiety       Initial BP (!) 142/86 (BP Location: Right arm, Patient Position: Sitting, Cuff Size: Adult Regular)   Pulse 80   Resp 18   Wt 82.6 kg (182 lb)   BMI 26.11 kg/m   Estimated body mass index is 26.11 kg/m  as calculated from the following:    Height as of 11/10/21: 1.778 m (5' 10\").    Weight as of this encounter: 82.6 kg (182 lb).  Medication Reconciliation: complete    FOOD SECURITY SCREENING QUESTIONS:    The next two questions are to help us understand your food security.  If you are feeling you need any assistance in this area, we have resources available to support you today.    Hunger Vital Signs:  Within the past 12 months we worried whether our food would run out before we got money to buy more. Never  Within the past 12 months the food we bought just didn't last and we didn't have money to get more. Never      Sangita Yepez RN      "

## 2022-05-03 ASSESSMENT — ANXIETY QUESTIONNAIRES: GAD7 TOTAL SCORE: 16

## 2022-09-10 ENCOUNTER — HEALTH MAINTENANCE LETTER (OUTPATIENT)
Age: 57
End: 2022-09-10

## 2022-10-17 ENCOUNTER — OFFICE VISIT (OUTPATIENT)
Dept: FAMILY MEDICINE | Facility: OTHER | Age: 57
End: 2022-10-17
Attending: NURSE PRACTITIONER
Payer: COMMERCIAL

## 2022-10-17 ENCOUNTER — HOSPITAL ENCOUNTER (OUTPATIENT)
Dept: GENERAL RADIOLOGY | Facility: OTHER | Age: 57
Discharge: HOME | End: 2022-10-17
Attending: STUDENT IN AN ORGANIZED HEALTH CARE EDUCATION/TRAINING PROGRAM
Payer: COMMERCIAL

## 2022-10-17 VITALS
SYSTOLIC BLOOD PRESSURE: 130 MMHG | WEIGHT: 163.8 LBS | RESPIRATION RATE: 17 BRPM | BODY MASS INDEX: 23.45 KG/M2 | OXYGEN SATURATION: 100 % | HEART RATE: 52 BPM | TEMPERATURE: 96.8 F | DIASTOLIC BLOOD PRESSURE: 88 MMHG | HEIGHT: 70 IN

## 2022-10-17 DIAGNOSIS — J02.9 SORE THROAT: Primary | ICD-10-CM

## 2022-10-17 DIAGNOSIS — R50.9 FEVER, UNSPECIFIED FEVER CAUSE: ICD-10-CM

## 2022-10-17 LAB
FLUAV RNA SPEC QL NAA+PROBE: POSITIVE
FLUBV RNA RESP QL NAA+PROBE: NEGATIVE
GROUP A STREP BY PCR: NOT DETECTED
RSV RNA SPEC NAA+PROBE: NEGATIVE
SARS-COV-2 RNA RESP QL NAA+PROBE: NEGATIVE

## 2022-10-17 PROCEDURE — 71046 X-RAY EXAM CHEST 2 VIEWS: CPT

## 2022-10-17 PROCEDURE — 99213 OFFICE O/P EST LOW 20 MIN: CPT | Mod: CS | Performed by: STUDENT IN AN ORGANIZED HEALTH CARE EDUCATION/TRAINING PROGRAM

## 2022-10-17 PROCEDURE — 87637 SARSCOV2&INF A&B&RSV AMP PRB: CPT | Mod: ZL | Performed by: STUDENT IN AN ORGANIZED HEALTH CARE EDUCATION/TRAINING PROGRAM

## 2022-10-17 PROCEDURE — 87651 STREP A DNA AMP PROBE: CPT | Mod: ZL | Performed by: STUDENT IN AN ORGANIZED HEALTH CARE EDUCATION/TRAINING PROGRAM

## 2022-10-17 PROCEDURE — C9803 HOPD COVID-19 SPEC COLLECT: HCPCS | Performed by: STUDENT IN AN ORGANIZED HEALTH CARE EDUCATION/TRAINING PROGRAM

## 2022-10-17 ASSESSMENT — PAIN SCALES - GENERAL: PAINLEVEL: NO PAIN (1)

## 2022-10-17 NOTE — PROGRESS NOTES
"Mr. Arthur is a 57 year old male who presents to the [facility] clinic for body aches    HPI     Cough nonproductive.   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   No sick contacts at home.   No neck pain.   Temp at home 101.8F.   On nyquil and dayquil.     Feels somewhat of a sore throat  No lymphadenopathy  No plaques on his tonsils    Works as a  symptoms started last Friday          Review of Systems     Reviewed and updated as needed this visit by Provider                   EXAM:   Vitals:    10/17/22 1029   BP: 130/88   BP Location: Right arm   Patient Position: Sitting   Cuff Size: Adult Regular   Pulse: 52   Resp: 17   Temp: 96.8  F (36  C)   TempSrc: Tympanic   SpO2: 100%   Weight: 74.3 kg (163 lb 12.8 oz)   Height: 1.778 m (5' 10\")         BP Readings from Last 3 Encounters:   10/17/22 130/88   05/02/22 (!) 142/86   11/10/21 130/86      Wt Readings from Last 3 Encounters:   10/17/22 74.3 kg (163 lb 12.8 oz)   05/02/22 82.6 kg (182 lb)   11/10/21 83.8 kg (184 lb 11.9 oz)      Estimated body mass index is 23.5 kg/m  as calculated from the following:    Height as of this encounter: 1.778 m (5' 10\").    Weight as of this encounter: 74.3 kg (163 lb 12.8 oz).     Physical Exam   General: Pleasant 57-year-old man sitting clinic no acute distress  HEENT: No cervical lymphadenopathy no sore exudate oropharyngeal abnormality.  CV: Regular rate occasional dropped beat regular rhythm.  No murmur appreciated  Pulmonary: Clear to auscultation      INVESTIGATIONS:  - Labs reviewed in [facility]     ASSESSMENT AND PLAN:    ICD-10-CM    1. Sore throat  J02.9 Group A Streptococcus PCR Throat Swab     Symptomatic; Unknown Influenza A/B & SARS-CoV2 (COVID-19) Virus PCR Multiplex Nose      2. Fever, unspecified fever cause  R50.9 XR Chest 2 Views     Symptomatic; Unknown Influenza A/B & SARS-CoV2 (COVID-19) Virus PCR Multiplex Nose          Assessment/Plan:     Sore throat and fever: Patient was exposed to Streptococcus though highly unlikely to be " strep throat as he has a cough and no lymphadenopathy or tonsillar exudate.  Was swabbed by the nurse so we will treat if returns positive.  Suspect he has some sort of viral illness, will swab for COVID influenza and RSV as he is a  and could be a significant  of an illness.  Symptoms onset 3 days prior to presentation.  Also obtain chest x-ray which demonstrates no pneumonia.     Recommend supportive cares. Discussed in person that his strep test was negative.       Electronically signed by:  Chirag Camargo MD on 10/17/2022  Internal Medicine  [facility]

## 2022-10-17 NOTE — NURSING NOTE
"Chief Complaint   Patient presents with     Cough     Sore throat, body aches, fever   Patient presents to clinic with cough, fever, sore throat and body aches. He states symptoms have been ongoing for three days and he had exposure on Wednesday to coworker with strep.     Initial /88 (BP Location: Right arm, Patient Position: Sitting, Cuff Size: Adult Regular)   Pulse 52   Temp 96.8  F (36  C) (Tympanic)   Resp 17   Ht 1.778 m (5' 10\")   Wt 74.3 kg (163 lb 12.8 oz)   SpO2 100%   BMI 23.50 kg/m   Estimated body mass index is 23.5 kg/m  as calculated from the following:    Height as of this encounter: 1.778 m (5' 10\").    Weight as of this encounter: 74.3 kg (163 lb 12.8 oz).  Medication Reconciliation: complete        Patricia Sosa  "

## 2023-01-22 ENCOUNTER — HEALTH MAINTENANCE LETTER (OUTPATIENT)
Age: 58
End: 2023-01-22

## 2023-02-20 ENCOUNTER — VIRTUAL VISIT (OUTPATIENT)
Dept: FAMILY MEDICINE | Facility: OTHER | Age: 58
End: 2023-02-20
Attending: PHYSICIAN ASSISTANT
Payer: COMMERCIAL

## 2023-02-20 ENCOUNTER — E-VISIT (OUTPATIENT)
Dept: FAMILY MEDICINE | Facility: OTHER | Age: 58
End: 2023-02-20

## 2023-02-20 DIAGNOSIS — U07.1 INFECTION DUE TO 2019 NOVEL CORONAVIRUS: Primary | ICD-10-CM

## 2023-02-20 PROCEDURE — 99212 OFFICE O/P EST SF 10 MIN: CPT | Mod: 95 | Performed by: PHYSICIAN ASSISTANT

## 2023-02-20 NOTE — PROGRESS NOTES
Frank is a 57 year old who is being evaluated via a billable telephone visit.      What phone number would you like to be contacted at? [phone]  How would you like to obtain your AVS? Andra    Distant Location (provider location):  On-site    Assessment & Plan     1. Infection due to 2019 novel coronavirus  - nirmatrelvir and ritonavir (PAXLOVID) therapy pack; Take 3 tablets by mouth 2 times daily for 5 days (Take 2 Nirmatrelvir tablets and 1 Ritonavir tablet twice daily for 5 days)  Dispense: 30 tablet; Refill: 0  - You tested positive for COVID (regardless of vaccination status): stay home for 5 days. If you have no symptoms or symptoms are resolving after 5 days, you may leave the house per CDC guidelines. Continue to wear a mask around others for 5 days.     Continue symptomatic remedies such as salt water gargles, increase hydration, rest, alternating Tylenol and ibuprofen if able/needed, Vicks Vapor rub on the chest and other symptomatic remedies.    You may test positive for up to 90 days on repeat testing.     Paxlovid: goal of Paxlovid to reduce hospitalization risk.   - We discussed risks, benefits, renal dosing, medication interactions for Paxlovid. Patient opted accepted Paxlovid prescription.     Return if symptoms worsen or fail to improve.    Sangita Sawyer PA-C  [facility]    Subjective   Frank is a 57 year old accompanied by his spouse, presenting for the following health issues:  Covid Concern (Antivirals)    HPI     COVID-19 Symptom Review  How many days ago did these symptoms start? 02/19/2023    Are any of the following symptoms significant for you?    New or worsening difficulty breathing? No    Worsening cough? Yes, it's a dry cough.     Fever or chills? Yes, I felt feverish or had chills.    Headache: YES    Sore throat: YES    Chest pain: No    Diarrhea: No    Body aches? YES    What treatments has patient tried? None   Does patient live in a nursing home,  group home, or shelter? No  Does patient have a way to get food/medications during quarantined? Yes, I have a friend or family member who can help me.    Review of Systems   Constitutional, HEENT, cardiovascular, pulmonary, GI, , musculoskeletal, neuro, skin, endocrine and psych systems are negative, except as otherwise noted.        Objective    Vitals - Patient Reported  Systolic (Patient Reported): (!) 152  Diastolic (Patient Reported): (!) 91  SpO2 (Patient Reported): 96  Temperature (Patient Reported): 98.2  F (36.8  C)  Pulse (Patient Reported): 71  Pain Score: No Pain (0)    Physical Exam   GEN: healthy, alert and no distress  PSYCH: Alert and oriented times 3; coherent speech, normal   rate and volume, able to articulate logical thoughts, able   to abstract reason, no tangential thoughts, no hallucinations   or delusions  His affect is normal  RESP: No cough, no audible wheezing, able to talk in full sentences  Remainder of exam unable to be completed due to telephone visits    COVID positive reported on home testing this AM.     Phone call duration: 5 minutes

## 2023-02-20 NOTE — PATIENT INSTRUCTIONS
You tested positive for COVID (regardless of vaccination status): stay home for 5 days. If you have no symptoms or symptoms are resolving after 5 days, you may leave the house per CDC guidelines. Continue to wear a mask around others for 5 days.     Continue symptomatic remedies such as salt water gargles, increase hydration, rest, alternating Tylenol and ibuprofen if able/needed, Vicks Vapor rub on the chest and other symptomatic remedies.     You may test positive for up to 90 days on repeat testing.     Paxlovid: goal of Paxlovid to reduce hospitalization risk.   - We discussed risks, benefits, renal dosing, medication interactions for Paxlovid. Patient opted accepted Paxlovid prescription.

## 2023-02-20 NOTE — NURSING NOTE
"Chief Complaint   Patient presents with     Covid Concern     Antivirals       Initial There were no vitals taken for this visit. Estimated body mass index is 23.5 kg/m  as calculated from the following:    Height as of 10/17/22: 1.778 m (5' 10\").    Weight as of 10/17/22: 74.3 kg (163 lb 12.8 oz).  Medication Reconciliation: complete    FOOD SECURITY SCREENING QUESTIONS  Hunger Vital Signs:  Within the past 12 months we worried whether our food would run out before we got money to buy more. Never  Within the past 12 months the food we bought just didn't last and we didn't have money to get more. Never  Heidi Stuart LPN 2/20/2023 10:02 AM      "

## 2023-04-30 DIAGNOSIS — F41.1 GAD (GENERALIZED ANXIETY DISORDER): ICD-10-CM

## 2023-05-03 NOTE — TELEPHONE ENCOUNTER
[facility] [id number] [facility] sent Rx request for the following:      Requested Prescriptions   Pending Prescriptions Disp Refills     sertraline (ZOLOFT) 50 MG tablet [Pharmacy Med Name: SERTRALINE HCL 50 MG TABLET] 30 tablet 14     Sig: TAKE 1 TABLET BY MOUTH EVERY DAY     Last Prescription Date:   04/19/22  Last Fill Qty/Refills:         90, R-4  Last Office Visit:              02/20/23   Future Office visit:             Next 5 appointments (look out 90 days)    Jun 01, 2023  9:00 AM  PHYSICAL with Shemar Mckinney MD  [facility] ([facility] ) [address]  [place] [address]  [phone]        Lucy Talavera RN on 5/3/2023 at 10:50 AM

## 2023-06-01 ENCOUNTER — OFFICE VISIT (OUTPATIENT)
Dept: FAMILY MEDICINE | Facility: OTHER | Age: 58
End: 2023-06-01
Attending: FAMILY MEDICINE
Payer: COMMERCIAL

## 2023-06-01 VITALS
WEIGHT: 163 LBS | HEIGHT: 70 IN | OXYGEN SATURATION: 100 % | HEART RATE: 68 BPM | BODY MASS INDEX: 23.34 KG/M2 | SYSTOLIC BLOOD PRESSURE: 132 MMHG | RESPIRATION RATE: 16 BRPM | DIASTOLIC BLOOD PRESSURE: 76 MMHG | TEMPERATURE: 97.5 F

## 2023-06-01 DIAGNOSIS — Z12.11 SCREEN FOR COLON CANCER: ICD-10-CM

## 2023-06-01 DIAGNOSIS — Z00.00 ROUTINE GENERAL MEDICAL EXAMINATION AT A HEALTH CARE FACILITY: Primary | ICD-10-CM

## 2023-06-01 DIAGNOSIS — Z12.5 SCREENING FOR PROSTATE CANCER: ICD-10-CM

## 2023-06-01 DIAGNOSIS — Z13.220 LIPID SCREENING: ICD-10-CM

## 2023-06-01 DIAGNOSIS — Z13.1 SCREENING FOR DIABETES MELLITUS: ICD-10-CM

## 2023-06-01 DIAGNOSIS — Z11.4 SCREENING FOR HIV (HUMAN IMMUNODEFICIENCY VIRUS): ICD-10-CM

## 2023-06-01 DIAGNOSIS — F41.1 GAD (GENERALIZED ANXIETY DISORDER): ICD-10-CM

## 2023-06-01 DIAGNOSIS — L98.9 SKIN LESION: ICD-10-CM

## 2023-06-01 LAB
CHOLEST SERPL-MCNC: 182 MG/DL
FASTING STATUS PATIENT QL REPORTED: YES
GLUCOSE SERPL-MCNC: 120 MG/DL (ref 70–99)
HDLC SERPL-MCNC: 55 MG/DL
HOLD SPECIMEN: NORMAL
LDLC SERPL CALC-MCNC: 116 MG/DL
NONHDLC SERPL-MCNC: 127 MG/DL
PSA SERPL DL<=0.01 NG/ML-MCNC: 0.64 NG/ML (ref 0–3.5)
TRIGL SERPL-MCNC: 54 MG/DL

## 2023-06-01 PROCEDURE — 87389 HIV-1 AG W/HIV-1&-2 AB AG IA: CPT | Mod: ZL | Performed by: FAMILY MEDICINE

## 2023-06-01 PROCEDURE — 36415 COLL VENOUS BLD VENIPUNCTURE: CPT | Mod: ZL | Performed by: FAMILY MEDICINE

## 2023-06-01 PROCEDURE — 80061 LIPID PANEL: CPT | Mod: ZL | Performed by: FAMILY MEDICINE

## 2023-06-01 PROCEDURE — G0103 PSA SCREENING: HCPCS | Mod: ZL | Performed by: FAMILY MEDICINE

## 2023-06-01 PROCEDURE — 82947 ASSAY GLUCOSE BLOOD QUANT: CPT | Mod: ZL | Performed by: FAMILY MEDICINE

## 2023-06-01 PROCEDURE — 99396 PREV VISIT EST AGE 40-64: CPT | Performed by: FAMILY MEDICINE

## 2023-06-01 ASSESSMENT — ENCOUNTER SYMPTOMS
SHORTNESS OF BREATH: 0
CONSTIPATION: 0
NERVOUS/ANXIOUS: 0
ABDOMINAL PAIN: 0
DIZZINESS: 0
COUGH: 0
EYE PAIN: 0
PALPITATIONS: 0
ARTHRALGIAS: 0
NAUSEA: 0
PARESTHESIAS: 0
HEMATURIA: 0
DIARRHEA: 0
HEMATOCHEZIA: 0
MYALGIAS: 0
FREQUENCY: 0
JOINT SWELLING: 0
FEVER: 0
WEAKNESS: 0
SORE THROAT: 0
CHILLS: 0
HEADACHES: 0
HEARTBURN: 0
DYSURIA: 0

## 2023-06-01 ASSESSMENT — PATIENT HEALTH QUESTIONNAIRE - PHQ9
SUM OF ALL RESPONSES TO PHQ QUESTIONS 1-9: 5
10. IF YOU CHECKED OFF ANY PROBLEMS, HOW DIFFICULT HAVE THESE PROBLEMS MADE IT FOR YOU TO DO YOUR WORK, TAKE CARE OF THINGS AT HOME, OR GET ALONG WITH OTHER PEOPLE: NOT DIFFICULT AT ALL
SUM OF ALL RESPONSES TO PHQ QUESTIONS 1-9: 5

## 2023-06-01 ASSESSMENT — PAIN SCALES - GENERAL: PAINLEVEL: NO PAIN (0)

## 2023-06-01 NOTE — PROGRESS NOTES
SUBJECTIVE:   CC: Frank is an 57 year old who presents for preventative health visit.       6/1/2023     9:07 AM   Additional Questions   Roomed by ANGEL Gordon   Accompanied by Self         6/1/2023     9:07 AM   Patient Reported Additional Medications   Patient reports taking the following new medications N/A     Healthy Habits:     Getting at least 3 servings of Calcium per day:  NO    Bi-annual eye exam:  NO    Dental care twice a year:  NO    Sleep apnea or symptoms of sleep apnea:  None    Diet:  Breakfast skipped    Frequency of exercise:  4-5 days/week    Duration of exercise:  15-30 minutes    Taking medications regularly:  Yes    Medication side effects:  None    PHQ-2 Total Score: 2                      Social History     Tobacco Use     Smoking status: Never     Smokeless tobacco: Never   Vaping Use     Vaping status: Never Used   Substance Use Topics     Alcohol use: Yes     Comment: social             6/1/2023     8:45 AM   Alcohol Use   Prescreen: >3 drinks/day or >7 drinks/week? No       Last PSA:   Prostate Specific Antigen Screen   Date Value Ref Range Status   06/01/2023 0.64 0.00 - 3.50 ng/mL Final   08/25/2021 0.72 0.00 - 4.00 ug/L Final       Reviewed orders with patient. Reviewed health maintenance and updated orders accordingly - Yes  Lab work is in process  Labs reviewed in [facility]  Current Outpatient Medications   Medication Sig Dispense Refill     sertraline (ZOLOFT) 50 MG tablet Take 1 tablet (50 mg) by mouth daily 90 tablet 4     No Known Allergies    Reviewed and updated as needed this visit by clinical staff   Tobacco  Allergies  Meds   Med Hx  Surg Hx  Fam Hx  Soc Hx        Reviewed and updated as needed this visit by Provider                 Past Medical History:   Diagnosis Date     Family history of malignant neoplasm of digestive organ     4/7/2016     Major depressive disorder, recurrent, mild (H)     No Comments Provided     Pain in wrist     No Comments Provided      "Prediabetes     No Comments Provided      Past Surgical History:   Procedure Laterality Date     ARTHROSCOPY SHOULDER DECOMPRESSION Right           COLONOSCOPY  2005    father w/ colon cancer     COLONOSCOPY  04/07/2016          VASECTOMY      No Comments Provided       Review of Systems   Constitutional: Negative for chills and fever.   HENT: Negative for congestion, ear pain, hearing loss and sore throat.    Eyes: Negative for pain and visual disturbance.   Respiratory: Negative for cough and shortness of breath.    Cardiovascular: Negative for chest pain, palpitations and peripheral edema.   Gastrointestinal: Negative for abdominal pain, constipation, diarrhea, heartburn, hematochezia and nausea.   Genitourinary: Negative for dysuria, frequency, genital sores, hematuria, impotence, penile discharge and urgency.   Musculoskeletal: Negative for arthralgias, joint swelling and myalgias.   Skin: Negative for rash.   Neurological: Negative for dizziness, weakness, headaches and paresthesias.   Psychiatric/Behavioral: Negative for mood changes. The patient is not nervous/anxious.      Right temple skin lesion for a year, bleeds off and on.        OBJECTIVE:   /76   Pulse 68   Temp 97.5  F (36.4  C) (Tympanic)   Resp 16   Ht 1.772 m (5' 9.75\")   Wt 73.9 kg (163 lb)   SpO2 100%   BMI 23.56 kg/m      Physical Exam  GENERAL: healthy, alert and no distress  EYES: Eyes grossly normal to inspection, PERRL and conjunctivae and sclerae normal  HENT: ear canals and TM's normal, nose and mouth without ulcers or lesions  NECK: no adenopathy, no asymmetry, masses, or scars and thyroid normal to palpation  RESP: lungs clear to auscultation - no rales, rhonchi or wheezes  CV: regular rate and rhythm, normal S1 S2, no S3 or S4, no murmur, click or rub, no peripheral edema and peripheral pulses strong  ABDOMEN: soft, nontender, no hepatosplenomegaly, no masses and bowel sounds normal  MS: no gross musculoskeletal defects " noted, no edema  SKIN: right temple with an umbilicated papule, ulcerated center with increased vascularity  NEURO: Normal strength and tone, mentation intact and speech normal  PSYCH: mentation appears normal, affect normal/bright    Diagnostic Test Results:  Labs reviewed in Epic  Results for orders placed or performed in visit on 06/01/23   HIV Screening     Status: Normal   Result Value Ref Range    HIV Antigen Antibody Combo Nonreactive Nonreactive   PSA Screen GH     Status: Normal   Result Value Ref Range    Prostate Specific Antigen Screen 0.64 0.00 - 3.50 ng/mL    Narrative    This result is obtained using the Roche Elecsys total PSA method on the hans e601 immunoassay analyzer. Results obtained with different assay methods or kits cannot be used interchangeably.   Lipid Panel     Status: Abnormal   Result Value Ref Range    Cholesterol 182 <200 mg/dL    Triglycerides 54 <150 mg/dL    Direct Measure HDL 55 >=40 mg/dL    LDL Cholesterol Calculated 116 (H) <=100 mg/dL    Non HDL Cholesterol 127 <130 mg/dL    Narrative    Cholesterol  Desirable:  <200 mg/dL    Triglycerides  Normal:  Less than 150 mg/dL  Borderline High:  150-199 mg/dL  High:  200-499 mg/dL  Very High:  Greater than or equal to 500 mg/dL    Direct Measure HDL  Female:  Greater than or equal to 50 mg/dL   Male:  Greater than or equal to 40 mg/dL    LDL Cholesterol  Desirable:  <100mg/dL  Above Desirable:  100-129 mg/dL   Borderline High:  130-159 mg/dL   High:  160-189 mg/dL   Very High:  >= 190 mg/dL    Non HDL Cholesterol  Desirable:  130 mg/dL  Above Desirable:  130-159 mg/dL  Borderline High:  160-189 mg/dL  High:  190-219 mg/dL  Very High:  Greater than or equal to 220 mg/dL   Glucose     Status: Abnormal   Result Value Ref Range    Glucose 120 (H) 70 - 99 mg/dL    Patient Fasting > 8hrs? Yes    Extra Tube     Status: None    Narrative    The following orders were created for panel order Extra Tube.  Procedure                                Abnormality         Status                     ---------                               -----------         ------                     Extra Purple Top Tube[[id number]]                            Final result                 Please view results for these tests on the individual orders.   Extra Purple Top Tube     Status: None   Result Value Ref Range    Hold Specimen JIC          ASSESSMENT/PLAN:       ICD-10-CM    1. Routine general medical examination at a health care facility  Z00.00       2. Screening for HIV (human immunodeficiency virus)  Z11.4 HIV Screening     HIV Screening      3. Screen for colon cancer  Z12.11 Colonoscopy Screening  Referral      4. RAFAELA (generalized anxiety disorder)  F41.1 sertraline (ZOLOFT) 50 MG tablet      5. Screening for prostate cancer  Z12.5 PSA Screen GH     PSA Screen GH      6. Lipid screening  Z13.220 Lipid Panel     Lipid Panel      7. Screening for diabetes mellitus  Z13.1 Glucose     Glucose      8. Skin lesion  L98.9 Adult General Surg Referral        Skin lesion is very likely an early basal cell carcinoma. Will ask for removal by general surgery.           COUNSELING:   Reviewed preventive health counseling, as reflected in patient instructions       Regular exercise       Healthy diet/nutrition       Colorectal cancer screening       Prostate cancer screening        He reports that he has never smoked. He has never used smokeless tobacco.            Shemar Mckinney MD  [facility]  Answers for HPI/ROS submitted by the patient on 6/1/2023  If you checked off any problems, how difficult have these problems made it for you to do your work, take care of things at home, or get along with other people?: Not difficult at all  PHQ9 TOTAL SCORE: 5

## 2023-06-01 NOTE — NURSING NOTE
"Chief Complaint   Patient presents with     Physical       Initial /76   Pulse 68   Temp 97.5  F (36.4  C) (Tympanic)   Resp 16   Ht 1.772 m (5' 9.75\")   Wt 73.9 kg (163 lb)   SpO2 100%   BMI 23.56 kg/m   Estimated body mass index is 23.56 kg/m  as calculated from the following:    Height as of this encounter: 1.772 m (5' 9.75\").    Weight as of this encounter: 73.9 kg (163 lb).  Medication Reconciliation: complete    FOOD SECURITY SCREENING QUESTIONS  Hunger Vital Signs:  Within the past 12 months we worried whether our food would run out before we got money to buy more. Never  Within the past 12 months the food we bought just didn't last and we didn't have money to get more. Never  Heidi Stuart LPN 6/1/2023 9:10 AM      "

## 2023-06-02 DIAGNOSIS — Z12.11 ENCOUNTER FOR SCREENING COLONOSCOPY: Primary | ICD-10-CM

## 2023-06-02 LAB — HIV 1+2 AB+HIV1 P24 AG SERPL QL IA: NONREACTIVE

## 2023-06-02 RX ORDER — POLYETHYLENE GLYCOL 3350, SODIUM CHLORIDE, SODIUM BICARBONATE, POTASSIUM CHLORIDE 420; 11.2; 5.72; 1.48 G/4L; G/4L; G/4L; G/4L
4000 POWDER, FOR SOLUTION ORAL ONCE
Qty: 4000 ML | Refills: 0 | Status: SHIPPED | OUTPATIENT
Start: 2023-09-14 | End: 2023-09-14

## 2023-06-02 RX ORDER — BISACODYL 5 MG/1
TABLET, DELAYED RELEASE ORAL
Qty: 2 TABLET | Refills: 0 | Status: ON HOLD | OUTPATIENT
Start: 2023-09-14 | End: 2023-09-25

## 2023-06-02 NOTE — TELEPHONE ENCOUNTER
Screening Questions for the Scheduling of Screening Colonoscopies   (If Colonoscopy is diagnostic, Provider should review the chart before scheduling.)  Are you younger than 50 or older than 80?  NO  Do you take aspirin or fish oil?  NO (if yes, tell patient to stop 1 week prior to Colonoscopy)  Do you take warfarin (Coumadin), clopidogrel (Plavix), apixaban (Eliquis), dabigatram (Pradaxa), rivaroxaban (Xarelto) or any blood thinner? NO  Do you use oxygen at home?  NO  Do you have kidney disease? NO  Are you on dialysis? NO  Have you had a stroke or heart attack in the last year? NO  Have you had a stent in your heart or any blood vessel in the last year? NO  Have you had a transplant of any organ? NO  Have you had a colonoscopy or upper endoscopy (EGD) before? YES         When?  2016  Date of scheduled Colonoscopy. 09/21/2023  Provider LOGAN  Pharmacy [facility]

## 2023-07-31 ENCOUNTER — OFFICE VISIT (OUTPATIENT)
Dept: SURGERY | Facility: OTHER | Age: 58
End: 2023-07-31
Attending: FAMILY MEDICINE
Payer: COMMERCIAL

## 2023-07-31 VITALS
RESPIRATION RATE: 16 BRPM | DIASTOLIC BLOOD PRESSURE: 84 MMHG | WEIGHT: 163.2 LBS | SYSTOLIC BLOOD PRESSURE: 126 MMHG | HEART RATE: 69 BPM | TEMPERATURE: 97 F | OXYGEN SATURATION: 99 % | BODY MASS INDEX: 23.58 KG/M2

## 2023-07-31 DIAGNOSIS — L98.9 SKIN LESION: Primary | ICD-10-CM

## 2023-07-31 PROCEDURE — 11642 EXC F/E/E/N/L MAL+MRG 1.1-2: CPT | Performed by: SURGERY

## 2023-07-31 PROCEDURE — 88305 TISSUE EXAM BY PATHOLOGIST: CPT

## 2023-07-31 ASSESSMENT — PAIN SCALES - GENERAL: PAINLEVEL: NO PAIN (0)

## 2023-07-31 NOTE — PROGRESS NOTES
Procedure Note     Pre/Post Operative Diagnosis:   Right temple skin lesion    Procedure:    Excision of Right temple skin lesion    Surgeon: JESÚS Silva MD     Local Anesthesia: 1% lidocaine with0.25%Marcaine with epinephrine    Indication for the procedure:    This is a 57 year old male patient with Right temple skin lesion.  Patient states his primary care doctor noticed an erythematous skin lesion of the right temple.  Patient denies personal history of skin cancer.  He notes his mother had numerous skin cancers.  Clinically, there is a 3 mm x 4 mm slightly raised erythematous skin lesion on the right temple, near the hairline.  Actinic keratosis versus basal cell.  After explaining the risks to include bleeding, infection, recurrence or need for re-excision, and scarring the patient wished to proceed.    Procedure:   The area was prepped and draped in usual sterile fashion with ChloraPrep. After adequate local anesthesia, an elliptical skin incision was made to encompass the lesion, 1.5cm x 0.5 cm with margins.  The skin was closed with 4-0 Monocryl.  The skin was cleansed and dried and dressed with skin glue.    Plan:  The patient will be called with pathology results.  Patient will followup if there any problems with the wound including redness or drainage.      JESÚS Silva MD

## 2023-07-31 NOTE — PATIENT INSTRUCTIONS
Your incision was closed with stitches that will dissolve.  A glue was used to help keep the incision closed.    It is ok to  get the incision wet in the shower on the day after your procedure.     Don't soak in a tub, pool or lake for 1 week.   If you have concerns, please call.

## 2023-07-31 NOTE — NURSING NOTE
"Chief Complaint   Patient presents with    Procedure       Initial /84 (BP Location: Left arm, Patient Position: Sitting)   Pulse 69   Temp 97  F (36.1  C) (Tympanic)   Resp 16   Wt 74 kg (163 lb 3.2 oz)   SpO2 99%   BMI 23.58 kg/m   Estimated body mass index is 23.58 kg/m  as calculated from the following:    Height as of 6/1/23: 1.772 m (5' 9.75\").    Weight as of this encounter: 74 kg (163 lb 3.2 oz).  Medication Reconciliation: complete    Nimo Contreras RN   "

## 2023-07-31 NOTE — NURSING NOTE
"Chief Complaint   Patient presents with    Procedure       Initial /84 (BP Location: Left arm, Patient Position: Sitting)   Pulse 69   Temp 97  F (36.1  C) (Tympanic)   Resp 16   Wt 74 kg (163 lb 3.2 oz)   SpO2 99%   BMI 23.58 kg/m   Estimated body mass index is 23.58 kg/m  as calculated from the following:    Height as of 6/1/23: 1.772 m (5' 9.75\").    Weight as of this encounter: 74 kg (163 lb 3.2 oz).  Medication Reconciliation: complete    Cyndi Razo LPN    TIMEOUT  [place] Protocol    A. Pre-procedure verification complete yes  1-relevant information / documentation available, reviewed and properly matched to the patient; 2-consent accurate and complete, 3-equipment and supplies available    B. Site marking complete Yes        Site marked if not in continuous attendance with patient    C. TIME OUT completed yes  Time Out was conducted just prior to starting procedure to verify the eight required elements: 1-patient identity, 2-consent accurate and complete, 3-position, 4-correct side/site marked (if applicable), 5-procedure, 6-relevant images / results properly labeled and displayed (if applicable), 7-antibiotics / irrigation fluids (if applicable), 8-safety precautions.     "

## 2023-08-04 LAB
PATH REPORT.COMMENTS IMP SPEC: NORMAL
PATH REPORT.FINAL DX SPEC: NORMAL
PHOTO IMAGE: NORMAL

## 2023-09-25 ENCOUNTER — ANESTHESIA (OUTPATIENT)
Dept: SURGERY | Facility: OTHER | Age: 58
End: 2023-09-25
Payer: COMMERCIAL

## 2023-09-25 ENCOUNTER — HOSPITAL ENCOUNTER (OUTPATIENT)
Facility: OTHER | Age: 58
Discharge: HOME | End: 2023-09-25
Attending: SURGERY | Admitting: SURGERY
Payer: COMMERCIAL

## 2023-09-25 ENCOUNTER — ANESTHESIA EVENT (OUTPATIENT)
Dept: SURGERY | Facility: OTHER | Age: 58
End: 2023-09-25
Payer: COMMERCIAL

## 2023-09-25 VITALS
SYSTOLIC BLOOD PRESSURE: 140 MMHG | HEART RATE: 56 BPM | BODY MASS INDEX: 22.19 KG/M2 | TEMPERATURE: 97.9 F | WEIGHT: 155 LBS | OXYGEN SATURATION: 97 % | HEIGHT: 70 IN | DIASTOLIC BLOOD PRESSURE: 94 MMHG

## 2023-09-25 PROCEDURE — 45385 COLONOSCOPY W/LESION REMOVAL: CPT | Mod: PT | Performed by: SURGERY

## 2023-09-25 PROCEDURE — 45385 COLONOSCOPY W/LESION REMOVAL: CPT | Performed by: NURSE ANESTHETIST, CERTIFIED REGISTERED

## 2023-09-25 PROCEDURE — 999N000010 HC STATISTIC ANES STAT CODE-CRNA PER MINUTE: Performed by: SURGERY

## 2023-09-25 PROCEDURE — 45380 COLONOSCOPY AND BIOPSY: CPT | Performed by: SURGERY

## 2023-09-25 PROCEDURE — 250N000009 HC RX 250: Performed by: NURSE ANESTHETIST, CERTIFIED REGISTERED

## 2023-09-25 PROCEDURE — 258N000003 HC RX IP 258 OP 636: Performed by: SURGERY

## 2023-09-25 PROCEDURE — 88305 TISSUE EXAM BY PATHOLOGIST: CPT

## 2023-09-25 PROCEDURE — 250N000011 HC RX IP 250 OP 636: Performed by: NURSE ANESTHETIST, CERTIFIED REGISTERED

## 2023-09-25 RX ORDER — NALOXONE HYDROCHLORIDE 0.4 MG/ML
0.2 INJECTION, SOLUTION INTRAMUSCULAR; INTRAVENOUS; SUBCUTANEOUS
Status: DISCONTINUED | OUTPATIENT
Start: 2023-09-25 | End: 2023-09-25 | Stop reason: HOSPADM

## 2023-09-25 RX ORDER — SODIUM CHLORIDE, SODIUM LACTATE, POTASSIUM CHLORIDE, CALCIUM CHLORIDE 600; 310; 30; 20 MG/100ML; MG/100ML; MG/100ML; MG/100ML
INJECTION, SOLUTION INTRAVENOUS CONTINUOUS
Status: DISCONTINUED | OUTPATIENT
Start: 2023-09-25 | End: 2023-09-25 | Stop reason: HOSPADM

## 2023-09-25 RX ORDER — LIDOCAINE HYDROCHLORIDE 20 MG/ML
INJECTION, SOLUTION INFILTRATION; PERINEURAL PRN
Status: DISCONTINUED | OUTPATIENT
Start: 2023-09-25 | End: 2023-09-25

## 2023-09-25 RX ORDER — LIDOCAINE 40 MG/G
CREAM TOPICAL
Status: DISCONTINUED | OUTPATIENT
Start: 2023-09-25 | End: 2023-09-25 | Stop reason: HOSPADM

## 2023-09-25 RX ORDER — PROPOFOL 10 MG/ML
INJECTION, EMULSION INTRAVENOUS CONTINUOUS PRN
Status: DISCONTINUED | OUTPATIENT
Start: 2023-09-25 | End: 2023-09-25

## 2023-09-25 RX ORDER — NALOXONE HYDROCHLORIDE 0.4 MG/ML
0.4 INJECTION, SOLUTION INTRAMUSCULAR; INTRAVENOUS; SUBCUTANEOUS
Status: DISCONTINUED | OUTPATIENT
Start: 2023-09-25 | End: 2023-09-25 | Stop reason: HOSPADM

## 2023-09-25 RX ORDER — PROPOFOL 10 MG/ML
INJECTION, EMULSION INTRAVENOUS PRN
Status: DISCONTINUED | OUTPATIENT
Start: 2023-09-25 | End: 2023-09-25

## 2023-09-25 RX ORDER — FLUMAZENIL 0.1 MG/ML
0.2 INJECTION, SOLUTION INTRAVENOUS
Status: DISCONTINUED | OUTPATIENT
Start: 2023-09-25 | End: 2023-09-25 | Stop reason: HOSPADM

## 2023-09-25 RX ADMIN — PROPOFOL 70 MG: 10 INJECTION, EMULSION INTRAVENOUS at 08:03

## 2023-09-25 RX ADMIN — SODIUM CHLORIDE, POTASSIUM CHLORIDE, SODIUM LACTATE AND CALCIUM CHLORIDE: 600; 310; 30; 20 INJECTION, SOLUTION INTRAVENOUS at 07:58

## 2023-09-25 RX ADMIN — LIDOCAINE HYDROCHLORIDE 40 MG: 20 INJECTION, SOLUTION INFILTRATION; PERINEURAL at 08:03

## 2023-09-25 RX ADMIN — PROPOFOL 40 MG: 10 INJECTION, EMULSION INTRAVENOUS at 08:06

## 2023-09-25 RX ADMIN — PROPOFOL 140 MCG/KG/MIN: 10 INJECTION, EMULSION INTRAVENOUS at 08:03

## 2023-09-25 ASSESSMENT — ACTIVITIES OF DAILY LIVING (ADL)
ADLS_ACUITY_SCORE: 35
ADLS_ACUITY_SCORE: 35

## 2023-09-25 NOTE — ANESTHESIA PREPROCEDURE EVALUATION
Anesthesia Pre-Procedure Evaluation    Patient: Frank Arthur   MRN: [id number] : 1965        Procedure : Procedure(s):  Colonoscopy          Past Medical History:   Diagnosis Date    Family history of malignant neoplasm of digestive organ     2016    Major depressive disorder, recurrent, mild (H)     No Comments Provided    Pain in wrist     No Comments Provided    Prediabetes     No Comments Provided      Past Surgical History:   Procedure Laterality Date    ARTHROSCOPY SHOULDER DECOMPRESSION Right          COLONOSCOPY  2005    father w/ colon cancer    COLONOSCOPY  2016         VASECTOMY      No Comments Provided      No Known Allergies   Social History     Tobacco Use    Smoking status: Never    Smokeless tobacco: Never   Substance Use Topics    Alcohol use: Yes     Comment: social      Wt Readings from Last 1 Encounters:   23 70.3 kg (155 lb)        Anesthesia Evaluation   Pt has had prior anesthetic.         ROS/MED HX  ENT/Pulmonary:  - neg pulmonary ROS     Neurologic:  - neg neurologic ROS     Cardiovascular:  - neg cardiovascular ROS     METS/Exercise Tolerance: >4 METS    Hematologic:  - neg hematologic  ROS     Musculoskeletal:  - neg musculoskeletal ROS     GI/Hepatic: Comment: Hx of laryngopharyngeal reflux disease-no symptoms in recent years    (+)        bowel prep,            Renal/Genitourinary:  - neg Renal ROS     Endo:  - neg endo ROS     Psychiatric/Substance Use:     (+) psychiatric history depression       Infectious Disease:  - neg infectious disease ROS     Malignancy:  - neg malignancy ROS     Other:  - neg other ROS          Physical Exam    Airway        Mallampati: I   TM distance: > 3 FB   Neck ROM: full   Mouth opening: > 3 cm    Respiratory Devices and Support         Dental       (+) Minor Abnormalities - some fillings, tiny chips      Cardiovascular   cardiovascular exam normal          Pulmonary   pulmonary exam normal                OUTSIDE LABS:  CBC:    Lab Results   Component Value Date    WBC 7.9 11/10/2021    HGB 13.1 (L) 11/10/2021    HCT 38.4 (L) 11/10/2021     11/10/2021     BMP:   Lab Results   Component Value Date     11/10/2021     01/18/2016    POTASSIUM 3.5 11/10/2021    POTASSIUM 4.2 01/18/2016    CHLORIDE 105 11/10/2021    CHLORIDE 106 01/18/2016    CO2 25 11/10/2021    CO2 31 01/18/2016    BUN 15 11/10/2021    BUN 18 01/18/2016    CR 1.07 11/10/2021    CR 0.98 01/18/2016     (H) 06/01/2023     (H) 11/10/2021     COAGS: No results found for: PTT, INR, FIBR  POC: No results found for: BGM, HCG, HCGS  HEPATIC:   Lab Results   Component Value Date    ALBUMIN 4.0 11/10/2021    PROTTOTAL 5.9 (L) 11/10/2021    ALT 16 11/10/2021    AST 16 11/10/2021    ALKPHOS 51 11/10/2021    BILITOTAL 0.3 11/10/2021     OTHER:   Lab Results   Component Value Date    LACT 2.0 11/10/2021    A1C 6.0 08/25/2021    KEEGAN 8.9 11/10/2021    MAG 1.9 11/10/2021    TSH 2.78 11/10/2021    CRP 1.1 11/10/2021    SED 2 11/10/2021       Anesthesia Plan    ASA Status:  1    NPO Status:  NPO Appropriate    Anesthesia Type: MAC.     - Reason for MAC: straight local not clinically adequate              Consents    Anesthesia Plan(s) and associated risks, benefits, and realistic alternatives discussed. Questions answered and patient/representative(s) expressed understanding.     - Discussed: Risks, Benefits and Alternatives for BOTH SEDATION and the PROCEDURE were discussed     - Discussed with:  Patient            Postoperative Care            Comments:                ERIKA COOK CRNA

## 2023-09-25 NOTE — OP NOTE
PROCEDURE NOTE    SURGEON:Omi Avila MD    PRE-OP DIAGNOSIS:  Screening Colonoscopy      POST-OP DIAGNOSIS: sigmoid polyp    PROCEDURE:  colonoscopy with snare    SPECIMEN:      ID Type Source Tests Collected by Time Destination   1 : SIGMOID POLYP Polyp Large Intestine, Colon, Sigmoid SURGICAL PATHOLOGY EXAM Omi Avila MD 9/25/2023  8:31 AM        ANESTHESIA:  MAC CRNA Independent: Sallie Wu APRN CRNA   Coverage requested     ESTIMATED BLOOD LOSS: none    COMPLICATIONS:  None    INDICATION FOR THE PROCEDURE: The patient is a 58 year old male. The patient presents with hx of polyp and family hx of cancer. I explained to the patient the risks, benefits and alternatives to screening colonoscopy for evaluating for cancer or polyps. We discussed the risks including bleeding, perforation, potential inability to reach the cecum and the risks of sedation. The patient's questions were answered and the patient wished to proceed. Informed consent paperwork was completed.    PROCEDURE: The patient was taken to the endoscopy suite. Appropriate monitors were attached. The patient was placed in the left lateral decubitus position. Timeout was performed confirming the patient's identity and procedure to be performed.  After appropriate sedation was confirmed, digital rectal exam was performed.  There was normal tone and no gross abnormality was noted.  The lubricated colonoscope was introduced into the anus the colon was insufflated with air. The prep quality was adequate. Under direct visualization the scope was advanced to the cecum. The mucosa of the colon was inspected while withdrawing the scope. One 5 mm sigmoid polyp removed with cold snare. The scope was retroflexed in the rectum and the anorectal junction was inspected. No abnormalities were noted. The scope was returned to a neutral position and the colon was decompressed. The scope was removed. The patient tolerated the procedure with no immediately  apparent complication. The patient was taken to recovery in stable condition.    FOLLOW UP: RECOMMEND high fiber diet, will call with pathology results.     Omi Avila MD on 9/25/2023 at 8:36 AM

## 2023-09-25 NOTE — H&P
PRE-PROCEDURE NOTE    CHIEFCOMPLAINT / REASON FOR PROCEDURE:  Screening for polyps and colorectal cancer.    PERTINENT HISTORY   Patient is due for colonoscopy. Previous colonoscopy 2016. No family history of colon polyps or colon cancer.    Past Medical History:   Diagnosis Date    Family history of malignant neoplasm of digestive organ     4/7/2016    Major depressive disorder, recurrent, mild (H)     No Comments Provided    Pain in wrist     No Comments Provided    Prediabetes     No Comments Provided       Past Surgical History:   Procedure Laterality Date    ARTHROSCOPY SHOULDER DECOMPRESSION Right          COLONOSCOPY  2005    father w/ colon cancer    COLONOSCOPY  04/07/2016         VASECTOMY      No Comments Provided         Other:  None  Bleeding tendencies: No     Relevant Family History:  None     Relevant Social History:  None     10 point ROS of systems including Constitutional, Eyes, Respiratory, Cardiovascular, Gastroenterology, Genitourinary, Integumentary, Muscularskeletal, Psychiatric were all negative except for pertinent positives noted in my HPI.      ALLERGIES/SENSITIVITIES: No Known Allergies     CURRENT MEDICATIONS:    No current facility-administered medications on file prior to encounter.  sertraline (ZOLOFT) 50 MG tablet, Take 1 tablet (50 mg) by mouth daily            PRE-SEDATION ASSESSMENT:    LUNGS:  CTA B/L, no wheezing or crackles.  Heart & CV:  RRR no murmur.  Intact distal pulses, good cap refill.    Comment(s):      IMPRESSION: 58 year old male in need of screening colonoscopy.    PLAN:  I discussed screening colonoscopy with the patient. Anesthesia coverage requested.    Omi Avila MD    9/25/2023 7:40 AM

## 2023-09-25 NOTE — DISCHARGE INSTRUCTIONS
[facility]  Adult Discharge Orders & Instructions    ________________________________________________________________          For 12 hours after surgery  Get plenty of rest.  A responsible adult must stay with you for at least 12 hours after you leave the hospital.   You may feel lightheaded.  IF so, sit for a few minutes before standing.  Have someone help you get up.   You may have a slight fever. Call the doctor if your fever is over 101 F (38.3 C) (taken under the tongue) or lasts longer than 24 hours.  You may have a dry mouth, a sore throat, muscle aches or trouble sleeping.  These should go away after 24 hours.  Do not make important or legal decisions.  6.   Do not drive or use heavy equipment.  If you have weakness or tingling, don't drive or use heavy equipment until this feeling goes away.    To contact a doctor, call   [phone]_______________________

## 2023-09-25 NOTE — OR NURSING
Patient has been discharged to home at 0930 via ambulatory accompanied by his wife    Written discharge instructions were provided to patient.  Prescriptions were none.  Patient states their pain is 0/10, and denies any nausea or dizziness upon discharge.    Patient and adult caring for them verbalize understanding of discharge instructions including no driving until tomorrow and no longer taking narcotic pain medications - no operating mechanical equipment and no making any important decisions.They understand reason for discharge, and necessary follow-up appointments.       Zuleyma Ulrich RN

## 2023-09-25 NOTE — ANESTHESIA POSTPROCEDURE EVALUATION
Patient: Frank Arthur    Procedure: Procedure(s):  COLONOSCOPY, WITH POLYPECTOMY       Anesthesia Type:  MAC    Note:  Disposition: Outpatient   Postop Pain Control: Uneventful            Sign Out: Well controlled pain   PONV: No   Neuro/Psych: Uneventful            Sign Out: Acceptable/Baseline neuro status   Airway/Respiratory: Uneventful            Sign Out: Acceptable/Baseline resp. status   CV/Hemodynamics: Uneventful            Sign Out: Acceptable CV status; No obvious hypovolemia; No obvious fluid overload   Other NRE: NONE   DID A NON-ROUTINE EVENT OCCUR?            Last vitals:  Vitals Value Taken Time   /94 09/25/23 0915   Temp 97.9  F (36.6  C) 09/25/23 0838   Pulse 56 09/25/23 0915   Resp     SpO2 100 % 09/25/23 0918   Vitals shown include unvalidated device data.    Electronically Signed By: ERIKA COOK CRNA  September 25, 2023  9:46 AM

## 2023-09-25 NOTE — ANESTHESIA CARE TRANSFER NOTE
Patient: Frank Arthur    Procedure: Procedure(s):  COLONOSCOPY, WITH POLYPECTOMY       Diagnosis: Screening for colon cancer [Z12.11]  Diagnosis Additional Information: No value filed.    Anesthesia Type:   MAC     Note:    Oropharynx: oropharynx clear of all foreign objects  Level of Consciousness: awake  Oxygen Supplementation: room air    Independent Airway: airway patency satisfactory and stable    Vital Signs Stable: post-procedure vital signs reviewed and stable  Report to RN Given: handoff report given  Patient transferred to: Phase II    Handoff Report: Identifed the Patient, Identified the Reponsible Provider, Reviewed the pertinent medical history, Discussed the surgical course, Reviewed Intra-OP anesthesia mangement and issues during anesthesia, Set expectations for post-procedure period and Allowed opportunity for questions and acknowledgement of understanding      Vitals:  Vitals Value Taken Time   BP     Temp     Pulse     Resp     SpO2         Electronically Signed By: ERIKA COOK CRNA  September 25, 2023  8:39 AM

## 2023-09-28 LAB
PATH REPORT.COMMENTS IMP SPEC: NORMAL
PATH REPORT.FINAL DX SPEC: NORMAL
PATH REPORT.RELEVANT HX SPEC: NORMAL
PHOTO IMAGE: NORMAL

## 2024-02-15 ENCOUNTER — TELEPHONE (OUTPATIENT)
Dept: FAMILY MEDICINE | Facility: OTHER | Age: 59
End: 2024-02-15
Payer: COMMERCIAL

## 2024-02-15 DIAGNOSIS — R00.2 PALPITATIONS: Primary | ICD-10-CM

## 2024-02-15 NOTE — TELEPHONE ENCOUNTER
S-(situation): patient made appt for 3/7/24 for his heart skipping beats.     B-(background): patient was taking magnesium daily but stopped taking it the day he made an appt 1/25/23.     A-(assessment): his heart has not skipped a beat since stopping the magnesium.     When it would occur, he states that his heart would beat for 8 beats then skip one. Then beat for 6 beats then skip one. He was not symptomatic. He stated that his wife was more concerned that he was.     R-(recommendations): RN advised to keep his appt on 3/7/24 for evaluation. Will route to provider to see if labs can be ordered now.     KERMIT CRUMP RN on 2/15/2024 at 3:53 PM

## 2024-02-15 NOTE — TELEPHONE ENCOUNTER
"Caller reporting the following red-flag symptom(s) \"missed heartbeats\" on 1/25/24.    Per the system red-flag symptom policy, patient was instructed to:  speak with a Registered Nurse    Action:  Patient refused to speak with triage nurse. Pt only requested an appointment with TJP. He stated this has been going on for several months. Appointment scheduled for 3/7.      Rocio Tompkins on 2/15/2024 at 8:45 AM        "

## 2024-02-23 ENCOUNTER — LAB (OUTPATIENT)
Dept: LAB | Facility: OTHER | Age: 59
End: 2024-02-23
Payer: COMMERCIAL

## 2024-02-23 DIAGNOSIS — R00.2 PALPITATIONS: ICD-10-CM

## 2024-02-23 LAB
ANION GAP SERPL CALCULATED.3IONS-SCNC: 10 MMOL/L (ref 7–15)
BUN SERPL-MCNC: 11 MG/DL (ref 6–20)
CALCIUM SERPL-MCNC: 9.5 MG/DL (ref 8.6–10)
CHLORIDE SERPL-SCNC: 98 MMOL/L (ref 98–107)
CREAT SERPL-MCNC: 0.91 MG/DL (ref 0.67–1.17)
DEPRECATED HCO3 PLAS-SCNC: 27 MMOL/L (ref 22–29)
EGFRCR SERPLBLD CKD-EPI 2021: >90 ML/MIN/1.73M2
GLUCOSE SERPL-MCNC: 97 MG/DL (ref 70–99)
MAGNESIUM SERPL-MCNC: 1.9 MG/DL (ref 1.7–2.3)
POTASSIUM SERPL-SCNC: 4.3 MMOL/L (ref 3.4–5.3)
SODIUM SERPL-SCNC: 135 MMOL/L (ref 135–145)
TSH SERPL DL<=0.005 MIU/L-ACNC: 1.02 UIU/ML (ref 0.3–4.2)

## 2024-02-23 PROCEDURE — 80048 BASIC METABOLIC PNL TOTAL CA: CPT | Mod: ZL

## 2024-02-23 PROCEDURE — 84443 ASSAY THYROID STIM HORMONE: CPT | Mod: ZL

## 2024-02-23 PROCEDURE — 36415 COLL VENOUS BLD VENIPUNCTURE: CPT | Mod: ZL

## 2024-02-23 PROCEDURE — 83735 ASSAY OF MAGNESIUM: CPT | Mod: ZL

## 2024-07-07 ENCOUNTER — HEALTH MAINTENANCE LETTER (OUTPATIENT)
Age: 59
End: 2024-07-07

## 2024-08-05 DIAGNOSIS — F41.1 GAD (GENERALIZED ANXIETY DISORDER): ICD-10-CM

## 2024-08-09 ENCOUNTER — MYC MEDICAL ADVICE (OUTPATIENT)
Dept: FAMILY MEDICINE | Facility: OTHER | Age: 59
End: 2024-08-09
Payer: COMMERCIAL

## 2024-08-09 DIAGNOSIS — F41.1 GAD (GENERALIZED ANXIETY DISORDER): ICD-10-CM

## 2024-08-09 NOTE — TELEPHONE ENCOUNTER
6/1/2023  Last physical.      No upcoming appointments.      Sent message and then saw that refilled on 8/7.  Patient update on [employer].    Dora Tony RN on 8/9/2024 at 8:19 AM

## 2025-03-16 ENCOUNTER — HOSPITAL ENCOUNTER (OUTPATIENT)
Dept: GENERAL RADIOLOGY | Facility: OTHER | Age: 60
Discharge: HOME | End: 2025-03-16
Attending: NURSE PRACTITIONER
Payer: COMMERCIAL

## 2025-03-16 ENCOUNTER — OFFICE VISIT (OUTPATIENT)
Dept: FAMILY MEDICINE | Facility: OTHER | Age: 60
End: 2025-03-16
Attending: NURSE PRACTITIONER
Payer: COMMERCIAL

## 2025-03-16 VITALS
DIASTOLIC BLOOD PRESSURE: 94 MMHG | SYSTOLIC BLOOD PRESSURE: 152 MMHG | RESPIRATION RATE: 16 BRPM | WEIGHT: 170 LBS | TEMPERATURE: 97.1 F | BODY MASS INDEX: 24.34 KG/M2 | HEIGHT: 70 IN | OXYGEN SATURATION: 98 % | HEART RATE: 70 BPM

## 2025-03-16 DIAGNOSIS — R50.9 FEVER AND CHILLS: ICD-10-CM

## 2025-03-16 DIAGNOSIS — R06.02 EXERTIONAL SHORTNESS OF BREATH: ICD-10-CM

## 2025-03-16 DIAGNOSIS — R53.83 MALAISE AND FATIGUE: ICD-10-CM

## 2025-03-16 DIAGNOSIS — R53.81 MALAISE AND FATIGUE: ICD-10-CM

## 2025-03-16 DIAGNOSIS — R05.3 PERSISTENT COUGH: ICD-10-CM

## 2025-03-16 DIAGNOSIS — J22 ACUTE LOWER RESPIRATORY TRACT INFECTION: ICD-10-CM

## 2025-03-16 DIAGNOSIS — J01.90 ACUTE SINUSITIS TREATED WITH ANTIBIOTICS IN THE PAST 60 DAYS: Primary | ICD-10-CM

## 2025-03-16 PROCEDURE — 1125F AMNT PAIN NOTED PAIN PRSNT: CPT | Performed by: NURSE PRACTITIONER

## 2025-03-16 PROCEDURE — 3077F SYST BP >= 140 MM HG: CPT | Performed by: NURSE PRACTITIONER

## 2025-03-16 PROCEDURE — 71046 X-RAY EXAM CHEST 2 VIEWS: CPT

## 2025-03-16 PROCEDURE — 99214 OFFICE O/P EST MOD 30 MIN: CPT | Performed by: NURSE PRACTITIONER

## 2025-03-16 PROCEDURE — 3080F DIAST BP >= 90 MM HG: CPT | Performed by: NURSE PRACTITIONER

## 2025-03-16 RX ORDER — LEVOFLOXACIN 750 MG/1
750 TABLET, FILM COATED ORAL DAILY
Qty: 7 TABLET | Refills: 0 | Status: SHIPPED | OUTPATIENT
Start: 2025-03-16 | End: 2025-03-23

## 2025-03-16 ASSESSMENT — PAIN SCALES - GENERAL: PAINLEVEL_OUTOF10: MILD PAIN (3)

## 2025-03-16 ASSESSMENT — PATIENT HEALTH QUESTIONNAIRE - PHQ9: SUM OF ALL RESPONSES TO PHQ QUESTIONS 1-9: 3

## 2025-03-16 NOTE — NURSING NOTE
"Chief Complaint   Patient presents with    Chills     X 4 wks    Cough     X 4 weeks    Dizziness     This am     Patient tx with tylenol, advil, and cough syrup at night.  Cough is worse at night.  Dizziness is new this am    Initial BP (!) 152/94 (BP Location: Right arm, Patient Position: Sitting, Cuff Size: Adult Regular)   Pulse 70   Temp 97.1  F (36.2  C) (Tympanic)   Resp 16   Ht 1.778 m (5' 10\")   Wt 77.1 kg (170 lb)   SpO2 98%   BMI 24.39 kg/m   Estimated body mass index is 24.39 kg/m  as calculated from the following:    Height as of this encounter: 1.778 m (5' 10\").    Weight as of this encounter: 77.1 kg (170 lb).     Advance Care Directive on file? n      FOOD SECURITY SCREENING QUESTIONS:    The next two questions are to help us understand your food security.  If you are feeling you need any assistance in this area, we have resources available to support you today.    Hunger Vital Signs:  Within the past 12 months we worried whether our food would run out before we got money to buy more. Never  Within the past 12 months the food we bought just didn't last and we didn't have money to get more. Never  Moraima Fermin LPN,ANGEL on 3/16/2025 at 12:27 PM      Moraima Fermin LPN     " No No No

## 2025-03-16 NOTE — PROGRESS NOTES
ASSESSMENT/PLAN:     I have reviewed the nursing notes.  I have reviewed the findings, diagnosis, plan and need for follow up with the patient.        1. Fever and chills  - XR Chest 2 Views    2. Persistent cough  3. Exertional shortness of breath  - XR Chest 2 Views    Symptomatic treatment - Encouraged fluids, honey, elevation, humidifier, lozenges, topical vapor rub, rest, etc   Discussed warning signs/symptoms indicative of need to f/u  Follow up if symptoms persist or worsen or concerns    4. Acute lower respiratory tract infection  - levofloxacin (LEVAQUIN) 750 MG tablet; Take 1 tablet (750 mg) by mouth daily for 7 days.  Dispense: 7 tablet; Refill: 0    CXR completed and personally reviewed, with possible blunted right diaphragm, radiologist over read:  Negative chest    5. Malaise and fatigue  - XR Chest 2 Views    6. Acute sinusitis treated with antibiotics in the past 60 days (Primary)  - levofloxacin (LEVAQUIN) 750 MG tablet; Take 1 tablet (750 mg) by mouth daily for 7 days.  Dispense: 7 tablet; Refill: 0    Persisting sinus symptoms for 4 weeks, no improvement with 10 day course of Amoxicillin  May use over-the-counter Tylenol or ibuprofen PRN  May use over the counter Mucinex or decongestant PRN    Symptomatic treatment - Encouraged fluids, salt water gargles, honey, elevation, humidifier, saline nasal spray, sinus rinse/netti pot, lozenges, topical vapor rub, rest, etc   Discussed warning signs/symptoms indicative of need to f/u  Follow up if symptoms persist or worsen or concerns      I explained my diagnostic considerations and recommendations to the patient, who voiced understanding and agreement with the treatment plan. All questions were answered. We discussed potential side effects of any prescribed or recommended therapies, as well as expectations for response to treatments.    Ludivina Gloria NP  [facility]      SUBJECTIVE:   Frank Arthur is a 59 year old male who  presents to clinic today for the following health issues:  Cough and sinus     HPI  Patient with persisting nasal drainage/congestion, sinus pressure, post nasal drainage, headaches, sore throat, alternating hot and cold, cough, chest congestion, chest tightness with heaviness, mild exertional shortness of breath, and fatigue for the past 4 weeks no improvement and actually feels like he is worsening.  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  He is taking frequent Tylenol and ibuprofen to keep symptoms at bay.   Took course of Amoxicillin from [facility] x 10 days starting  2/20/25.          Past Medical History:   Diagnosis Date    Family history of malignant neoplasm of digestive organ     4/7/2016    Major depressive disorder, recurrent, mild     No Comments Provided    Pain in wrist     No Comments Provided    Prediabetes     No Comments Provided     Past Surgical History:   Procedure Laterality Date    ARTHROSCOPY SHOULDER DECOMPRESSION Right          COLONOSCOPY  2005    father w/ colon cancer    COLONOSCOPY  04/07/2016         COLONOSCOPY N/A 09/25/2023    benign polyp, follow up 5 years due to family history.    VASECTOMY      No Comments Provided     Social History     Tobacco Use    Smoking status: Never    Smokeless tobacco: Never   Substance Use Topics    Alcohol use: Yes     Comment: social     Current Outpatient Medications   Medication Sig Dispense Refill    sertraline (ZOLOFT) 50 MG tablet TAKE 1 TABLET DAILY 90 tablet 4     No Known Allergies      Past medical history, past surgical history, current medications and allergies reviewed and accurate to the best of my knowledge.        OBJECTIVE:     BP (!) 152/94 (BP Location: Right arm, Patient Position: Sitting, Cuff Size: Adult Regular)   Pulse 70   Temp 97.1  F (36.2  C) (Tympanic)   Resp 16   Ht 1.778  "m (5' 10\")   Wt 77.1 kg (170 lb)   SpO2 98%   BMI 24.39 kg/m    Body mass index is 24.39 kg/m .        Physical Exam  General Appearance: Well appearing adult male, appropriate appearance for age. No acute distress  Ears: Left TM intact, no erythema, serous effusion with bulging, no purulence.  Right TM intact, no erythema, serous effusion with bulging, no purulence.  Left auditory canal clear without drainage or bleeding.  Right auditory canal clear without drainage or bleeding.  Normal external ears, non tender.  Eyes: conjunctivae normal without erythema or irritation, corneas clear, no drainage or crusting, no eyelid swelling, pupils equal   Orophayrnx: moist mucous membranes, pharynx with erythema, tonsils without hypertrophy, tonsils without erythema, no tonsillar exudates, no oral lesions, no palate petechiae, no post nasal drip seen, no trismus, voice clear.    Sinuses:  Frontal sinus tenderness upon palpation   Nose:  Nares with erythema, edema, narrowing, congestion and drainage   Neck: supple without adenopathy  Respiratory: normal chest wall and respirations.  Normal effort.  Clear to auscultation bilaterally, no wheezing, crackles or rhonchi.  Occasional congested cough appreciated.  Cardiac: RRR with no murmurs  Musculoskeletal:  Equal movement of bilateral upper extremities.  Equal movement of bilateral lower extremities.  Normal gait.    Psychological: normal affect, alert, oriented, and pleasant.     Imaging:  Results for orders placed or performed in visit on 03/16/25   XR Chest 2 Views     Status: None    Narrative    EXAM: XR CHEST 2 VIEWS  LOCATION: [facility]  DATE: 03/16/2025    INDICATION: Persistent cough. Exertional shortness of breath. Malaise and fatigue. Fever and chills.  COMPARISON: 10/07/2022.      Impression    IMPRESSION: Negative chest.         "

## 2025-07-19 ENCOUNTER — HOSPITAL ENCOUNTER (EMERGENCY)
Facility: OTHER | Age: 60
Discharge: HOME | End: 2025-07-19
Payer: COMMERCIAL

## 2025-07-19 ENCOUNTER — APPOINTMENT (OUTPATIENT)
Dept: GENERAL RADIOLOGY | Facility: OTHER | Age: 60
End: 2025-07-19
Payer: COMMERCIAL

## 2025-07-19 ENCOUNTER — HEALTH MAINTENANCE LETTER (OUTPATIENT)
Age: 60
End: 2025-07-19

## 2025-07-19 VITALS
HEART RATE: 72 BPM | OXYGEN SATURATION: 100 % | TEMPERATURE: 97.1 F | RESPIRATION RATE: 18 BRPM | SYSTOLIC BLOOD PRESSURE: 132 MMHG | DIASTOLIC BLOOD PRESSURE: 86 MMHG

## 2025-07-19 DIAGNOSIS — S61.215A LACERATION OF LEFT RING FINGER WITHOUT FOREIGN BODY WITHOUT DAMAGE TO NAIL, INITIAL ENCOUNTER: ICD-10-CM

## 2025-07-19 DIAGNOSIS — S61.211A LACERATION OF LEFT INDEX FINGER WITHOUT FOREIGN BODY WITHOUT DAMAGE TO NAIL, INITIAL ENCOUNTER: ICD-10-CM

## 2025-07-19 DIAGNOSIS — S61.213A LACERATION OF LEFT MIDDLE FINGER WITHOUT FOREIGN BODY WITHOUT DAMAGE TO NAIL, INITIAL ENCOUNTER: ICD-10-CM

## 2025-07-19 LAB
GLUCOSE BLDC GLUCOMTR-MCNC: 119 MG/DL (ref 70–99)
HOLD SPECIMEN: NORMAL

## 2025-07-19 PROCEDURE — 90471 IMMUNIZATION ADMIN: CPT

## 2025-07-19 PROCEDURE — 250N000013 HC RX MED GY IP 250 OP 250 PS 637

## 2025-07-19 PROCEDURE — 73130 X-RAY EXAM OF HAND: CPT | Mod: LT

## 2025-07-19 PROCEDURE — 12005 RPR S/N/A/GEN/TRK12.6-20.0CM: CPT

## 2025-07-19 PROCEDURE — 99284 EMERGENCY DEPT VISIT MOD MDM: CPT | Mod: 25

## 2025-07-19 PROCEDURE — 90715 TDAP VACCINE 7 YRS/> IM: CPT

## 2025-07-19 PROCEDURE — 96376 TX/PRO/DX INJ SAME DRUG ADON: CPT | Mod: XU

## 2025-07-19 PROCEDURE — 250N000009 HC RX 250

## 2025-07-19 PROCEDURE — 99283 EMERGENCY DEPT VISIT LOW MDM: CPT | Mod: 25

## 2025-07-19 PROCEDURE — 73130 X-RAY EXAM OF HAND: CPT | Mod: 26 | Performed by: INTERNAL MEDICINE

## 2025-07-19 PROCEDURE — 96374 THER/PROPH/DIAG INJ IV PUSH: CPT | Mod: XU

## 2025-07-19 PROCEDURE — 82962 GLUCOSE BLOOD TEST: CPT

## 2025-07-19 PROCEDURE — 96375 TX/PRO/DX INJ NEW DRUG ADDON: CPT | Mod: XU

## 2025-07-19 PROCEDURE — 250N000011 HC RX IP 250 OP 636

## 2025-07-19 RX ORDER — OXYCODONE AND ACETAMINOPHEN 5; 325 MG/1; MG/1
1 TABLET ORAL EVERY 6 HOURS PRN
Qty: 12 TABLET | Refills: 0 | Status: SHIPPED | OUTPATIENT
Start: 2025-07-19

## 2025-07-19 RX ORDER — OXYCODONE HYDROCHLORIDE 5 MG/1
10 TABLET ORAL ONCE
Refills: 0 | Status: COMPLETED | OUTPATIENT
Start: 2025-07-19 | End: 2025-07-19

## 2025-07-19 RX ORDER — NEOMYCIN/BACITRACIN/POLYMYXINB 3.5-400-5K
OINTMENT (GRAM) TOPICAL ONCE
Status: DISCONTINUED | OUTPATIENT
Start: 2025-07-19 | End: 2025-07-20 | Stop reason: HOSPADM

## 2025-07-19 RX ORDER — OXYCODONE HYDROCHLORIDE 5 MG/1
5 TABLET ORAL EVERY 6 HOURS PRN
Qty: 5 TABLET | Refills: 0 | Status: SHIPPED | OUTPATIENT
Start: 2025-07-19 | End: 2025-07-19

## 2025-07-19 RX ORDER — HYDROMORPHONE HYDROCHLORIDE 1 MG/ML
0.5 INJECTION, SOLUTION INTRAMUSCULAR; INTRAVENOUS; SUBCUTANEOUS
Refills: 0 | Status: COMPLETED | OUTPATIENT
Start: 2025-07-19 | End: 2025-07-19

## 2025-07-19 RX ORDER — HYDROMORPHONE HYDROCHLORIDE 1 MG/ML
0.5 INJECTION, SOLUTION INTRAMUSCULAR; INTRAVENOUS; SUBCUTANEOUS ONCE
Refills: 0 | Status: COMPLETED | OUTPATIENT
Start: 2025-07-19 | End: 2025-07-19

## 2025-07-19 RX ORDER — OXYCODONE HYDROCHLORIDE 5 MG/1
5 TABLET ORAL EVERY 6 HOURS PRN
Qty: 6 TABLET | Refills: 0 | Status: SHIPPED | OUTPATIENT
Start: 2025-07-19 | End: 2025-07-19

## 2025-07-19 RX ORDER — IBUPROFEN 600 MG/1
600 TABLET, FILM COATED ORAL EVERY 6 HOURS PRN
Qty: 30 TABLET | Refills: 0 | Status: SHIPPED | OUTPATIENT
Start: 2025-07-19

## 2025-07-19 RX ADMIN — OXYCODONE HYDROCHLORIDE 10 MG: 5 TABLET ORAL at 18:29

## 2025-07-19 RX ADMIN — HYDROMORPHONE HYDROCHLORIDE 0.5 MG: 1 INJECTION, SOLUTION INTRAMUSCULAR; INTRAVENOUS; SUBCUTANEOUS at 17:07

## 2025-07-19 RX ADMIN — HYDROMORPHONE HYDROCHLORIDE 1 MG: 1 INJECTION, SOLUTION INTRAMUSCULAR; INTRAVENOUS; SUBCUTANEOUS at 18:01

## 2025-07-19 RX ADMIN — CLOSTRIDIUM TETANI TOXOID ANTIGEN (FORMALDEHYDE INACTIVATED), CORYNEBACTERIUM DIPHTHERIAE TOXOID ANTIGEN (FORMALDEHYDE INACTIVATED), BORDETELLA PERTUSSIS TOXOID ANTIGEN (GLUTARALDEHYDE INACTIVATED), BORDETELLA PERTUSSIS FILAMENTOUS HEMAGGLUTININ ANTIGEN (FORMALDEHYDE INACTIVATED), BORDETELLA PERTUSSIS PERTACTIN ANTIGEN, AND BORDETELLA PERTUSSIS FIMBRIAE 2/3 ANTIGEN 0.5 ML: 5; 2; 2.5; 5; 3; 5 INJECTION, SUSPENSION INTRAMUSCULAR at 17:27

## 2025-07-19 RX ADMIN — MIDAZOLAM 0.5 MG: 1 INJECTION INTRAMUSCULAR; INTRAVENOUS at 17:11

## 2025-07-19 RX ADMIN — LIDOCAINE HYDROCHLORIDE 10 ML: 10 INJECTION, SOLUTION EPIDURAL; INFILTRATION; INTRACAUDAL; PERINEURAL at 21:07

## 2025-07-19 RX ADMIN — HYDROMORPHONE HYDROCHLORIDE 0.5 MG: 1 INJECTION, SOLUTION INTRAMUSCULAR; INTRAVENOUS; SUBCUTANEOUS at 17:26

## 2025-07-19 RX ADMIN — LIDOCAINE HYDROCHLORIDE 30 ML: 10 INJECTION, SOLUTION EPIDURAL; INFILTRATION; INTRACAUDAL; PERINEURAL at 21:08

## 2025-07-19 RX ADMIN — HYDROMORPHONE HYDROCHLORIDE 0.5 MG: 1 INJECTION, SOLUTION INTRAMUSCULAR; INTRAVENOUS; SUBCUTANEOUS at 17:01

## 2025-07-19 ASSESSMENT — ACTIVITIES OF DAILY LIVING (ADL)
ADLS_ACUITY_SCORE: 41

## 2025-07-19 ASSESSMENT — COLUMBIA-SUICIDE SEVERITY RATING SCALE - C-SSRS
2. HAVE YOU ACTUALLY HAD ANY THOUGHTS OF KILLING YOURSELF IN THE PAST MONTH?: NO
6. HAVE YOU EVER DONE ANYTHING, STARTED TO DO ANYTHING, OR PREPARED TO DO ANYTHING TO END YOUR LIFE?: NO
1. IN THE PAST MONTH, HAVE YOU WISHED YOU WERE DEAD OR WISHED YOU COULD GO TO SLEEP AND NOT WAKE UP?: NO

## 2025-07-19 NOTE — ED TRIAGE NOTES
Patient cut three fingers on his left hand with a table saw.  Patient is not on blood thinners     Triage Assessment (Adult)       Row Name 07/19/25 7538          Triage Assessment    Airway WDL WDL        Respiratory WDL    Respiratory WDL WDL        Skin Circulation/Temperature WDL    Skin Circulation/Temperature WDL X        Cardiac WDL    Cardiac WDL WDL        Peripheral/Neurovascular WDL    Peripheral Neurovascular WDL WDL        Cognitive/Neuro/Behavioral WDL    Cognitive/Neuro/Behavioral WDL WDL

## 2025-07-20 ENCOUNTER — PATIENT OUTREACH (OUTPATIENT)
Dept: CARE COORDINATION | Facility: CLINIC | Age: 60
End: 2025-07-20
Payer: COMMERCIAL

## 2025-07-20 NOTE — PROCEDURES
[facility]    -Laceration Repair    Date/Time: 7/20/2025 9:49 AM    Performed by: Roseline Arguelles MD  Authorized by: Roseline Arguelles MD    Risks, benefits and alternatives discussed.      UNIVERSAL PROTOCOL   Site Marked: NA  Prior Images Obtained and Reviewed:  Yes  Required items: Required blood products, implants, devices and special equipment available    Patient identity confirmed:  Verbally with patient    ANESTHESIA (see MAR for exact dosages):     Anesthesia method:  Nerve block    REPAIR TYPE:     Repair type:  Intermediate    EXPLORATION:     Hemostasis achieved with:  Tourniquet    Wound exploration: wound explored through full range of motion and entire depth of wound probed and visualized      Wound extent: areolar tissue violated and vascular damage      Wound extent: fascia not violated, no foreign body, no signs of injury, no nerve damage, no tendon damage and no underlying fracture      Contaminated: no      TREATMENT:     Area cleansed with:  Saline    Amount of cleaning:  Extensive    Irrigation solution:  Tap water    Irrigation volume:  5 L    Irrigation method:  Tap    Visualized foreign bodies/material removed: no      SKIN REPAIR     Repair method:  Sutures    Suture size:  3-0    Suture material:  Nylon    Suture technique:  Simple interrupted    APPROXIMATION     Approximation:  Close    POST-PROCEDURE DETAILS     Dressing:  Antibiotic ointment and adhesive bandage      PROCEDURE    Patient Tolerance:  Patient tolerated the procedure well with no immediate complications     LACERATION DETAILS  Macerated half-moon shaped lacerations to finger pads of digits 2, 3, 4 distal to the MCP joint.  Approximately 5 cm each.  Index finger repaired with 9 sutures, middle finger repaired with 4 sutures, ring finger repaired with 6 sutures.

## 2025-07-20 NOTE — DISCHARGE INSTRUCTIONS
You have 19 stitches in your fingers.  Keep them covered with Neosporin and bandages every day.  You can gently let water run over them in the shower, do not scrub at them.     For pain control you should take Tylenol every 6 hours, 600 500 mg.  You should also take ibuprofen 400 mg every 6 hours.  You can safely take both of these at the same time.  For pain that is severe and prevents you from sleeping at night you can take oxycodone 5 mg.  This will make you sleepy, make sure you do not drive your car or do anything important or operate heavy machinery (such as a table saw) when you are on oxycodone.    If there is any sign of infection such as fever, nausea, vomiting, pus or green or yellow discharge coming out of your fingers come back to the emergency department immediately.    Please call [phone] to schedule your appointment with the orthopedic surgeon.     You will need your sutures removed in 7 to 14 days.  If you are not able to get in with the orthopedic surgeon in this timeline please make an appointment with your primary care doctor to have the wound looked at and to have the stitches removed.

## 2025-07-20 NOTE — PROCEDURES
[facility]    Digital Block    Date/Time: 7/20/2025 9:48 AM    Performed by: Roseline Arguelles MD  Authorized by: Roseline Arguelles MD    Risks, benefits and alternatives discussed.      INDICATION     Indications:  Procedural anesthesia    LOCATION     Block location:  Finger    Finger blocked:  L long finger, L ring finger and L index finger    PRE-PROCEDURE DETAILS     Neurovascular status: intact      Skin preparation:  2% chlorhexidine    ANESTHESIA (see MAR for exact dosages)     Syringe type:  Controlled syringe    Needle gauge:  25 G    Anesthetic injected:  Lidocaine 1% w/o epi    Technique:  Metacarpal block    Injection procedure:  Anatomic landmarks identified, anatomic landmarks palpated, introduced needle, incremental injection and negative aspiration for blood    POST-PROCEDURE DETAILS     Outcome:  Anesthesia achieved      PROCEDURE  Describe Procedure: Digital nerve block with 35 ml 1% lidocaine without epi for laceration repair and washout.  Tolerated well.

## 2025-07-30 ENCOUNTER — ALLIED HEALTH/NURSE VISIT (OUTPATIENT)
Dept: FAMILY MEDICINE | Facility: OTHER | Age: 60
End: 2025-07-30
Payer: COMMERCIAL

## 2025-07-30 DIAGNOSIS — M79.645 PAIN IN LEFT FINGER(S): ICD-10-CM

## 2025-07-30 DIAGNOSIS — S61.213D LACERATION OF LEFT MIDDLE FINGER WITHOUT FOREIGN BODY WITHOUT DAMAGE TO NAIL, SUBSEQUENT ENCOUNTER: ICD-10-CM

## 2025-07-30 DIAGNOSIS — S61.211D LACERATION OF LEFT INDEX FINGER WITHOUT FOREIGN BODY WITHOUT DAMAGE TO NAIL, SUBSEQUENT ENCOUNTER: ICD-10-CM

## 2025-07-30 DIAGNOSIS — S69.92XD INJURY OF FINGER OF LEFT HAND, SUBSEQUENT ENCOUNTER: Primary | ICD-10-CM

## 2025-07-30 DIAGNOSIS — S61.215D LACERATION OF LEFT RING FINGER WITHOUT FOREIGN BODY WITHOUT DAMAGE TO NAIL, SUBSEQUENT ENCOUNTER: ICD-10-CM

## 2025-07-30 RX ORDER — OXYCODONE AND ACETAMINOPHEN 5; 325 MG/1; MG/1
1 TABLET ORAL
Qty: 5 TABLET | Refills: 0 | Status: SHIPPED | OUTPATIENT
Start: 2025-07-30

## 2025-07-30 NOTE — PROCEDURES
Frank Arthur presents to the clinic for removal of sutures. The patient has had sutures in place for 11 days. There has been no patient reported signs or symptoms of infection or drainage. 19  sutures are seen and located on the left pointer, middle and ring finger. Tetanus status is up to date. All sutures were easily removed today. Routine wound care discussed by the RN or provider. The patient will follow up as needed.     Jennifer Yeh LPN 7/30/2025 10:29 AM

## 2025-07-30 NOTE — PROGRESS NOTES
Patient seen in ED on 7/19/2025 for lacerations to his left fingers including index, middle, and ring finger due to injury from saw.  Patient returns to rapid clinic today for suture removal.  He states wounds have been draining a little bit.  He continues to have significant pain, most bothersome at bedtime and would like a refill of Percocet for bedtime use only.  He is using Tylenol with ibuprofen every 6 hours during the daytime.  No fevers.  Nurse removed stitches.  Steri-Strips applied.  Patient will be started on Augmentin due to delayed wound healing and near bone and joint.  Patient was given a prescription for Percocet 5 tabs to use at bedtime only with no refills.  See attached photo.  Ludivina Gloria NP on 7/30/2025 at 10:21 AM

## 2025-08-23 ENCOUNTER — TELEPHONE (OUTPATIENT)
Dept: FAMILY MEDICINE | Facility: OTHER | Age: 60
End: 2025-08-23
Payer: COMMERCIAL

## 2025-08-23 DIAGNOSIS — F41.1 GAD (GENERALIZED ANXIETY DISORDER): Primary | ICD-10-CM

## (undated) DEVICE — ENDO KIT COMPLIANCE DYKENDOCMPLY

## (undated) DEVICE — SOL WATER 1500ML

## (undated) DEVICE — DEVICE RETRIEVAL ROTH NET 3.89MMX160CM 00711155

## (undated) DEVICE — SUCTION MANIFOLD NEPTUNE 2 SYS 4 PORT 0702-020-000

## (undated) DEVICE — ENDO BRUSH CHANNEL MASTER CLEANING 2-4.2MM BW-412T

## (undated) DEVICE — ENDO TRAP POLYP E-TRAP 00711099

## (undated) DEVICE — TUBING SUCTION 10'X3/16" N510

## (undated) RX ORDER — HYDROMORPHONE HYDROCHLORIDE 1 MG/ML
INJECTION, SOLUTION INTRAMUSCULAR; INTRAVENOUS; SUBCUTANEOUS
Status: DISPENSED
Start: 2025-07-19

## (undated) RX ORDER — OXYCODONE HYDROCHLORIDE 5 MG/1
TABLET ORAL
Status: DISPENSED
Start: 2025-07-19

## (undated) RX ORDER — PROPOFOL 10 MG/ML
INJECTION, EMULSION INTRAVENOUS
Status: DISPENSED
Start: 2023-09-25

## (undated) RX ORDER — NEOMYCIN/BACITRACIN/POLYMYXINB 3.5-400-5K
OINTMENT (GRAM) TOPICAL
Status: DISPENSED
Start: 2025-07-19

## (undated) RX ORDER — SODIUM CHLORIDE 9 MG/ML
INJECTION, SOLUTION INTRAVENOUS
Status: DISPENSED
Start: 2021-11-10

## (undated) RX ORDER — MAGNESIUM OXIDE 400 MG/1
TABLET ORAL
Status: DISPENSED
Start: 2021-11-10

## (undated) RX ORDER — LIDOCAINE HYDROCHLORIDE 10 MG/ML
INJECTION, SOLUTION EPIDURAL; INFILTRATION; INTRACAUDAL; PERINEURAL
Status: DISPENSED
Start: 2025-07-19